# Patient Record
Sex: FEMALE | Race: BLACK OR AFRICAN AMERICAN | Employment: UNEMPLOYED | URBAN - METROPOLITAN AREA
[De-identification: names, ages, dates, MRNs, and addresses within clinical notes are randomized per-mention and may not be internally consistent; named-entity substitution may affect disease eponyms.]

---

## 2020-04-24 ENCOUNTER — HOSPITAL ENCOUNTER (INPATIENT)
Facility: CLINIC | Age: 25
LOS: 4 days | Discharge: HOME | End: 2020-04-28
Attending: EMERGENCY MEDICINE | Admitting: PSYCHIATRY & NEUROLOGY
Payer: COMMERCIAL

## 2020-04-24 ENCOUNTER — TRANSFERRED RECORDS (OUTPATIENT)
Dept: HEALTH INFORMATION MANAGEMENT | Facility: CLINIC | Age: 25
End: 2020-04-24

## 2020-04-24 ENCOUNTER — TELEPHONE (OUTPATIENT)
Dept: BEHAVIORAL HEALTH | Facility: CLINIC | Age: 25
End: 2020-04-24

## 2020-04-24 DIAGNOSIS — F10.129 ALCOHOL ABUSE WITH INTOXICATION (H): ICD-10-CM

## 2020-04-24 DIAGNOSIS — R45.851 SUICIDAL IDEATION: ICD-10-CM

## 2020-04-24 DIAGNOSIS — F31.5 BIPOLAR I DISORDER, MOST RECENT EPISODE (OR CURRENT) DEPRESSED, SEVERE, SPECIFIED AS WITH PSYCHOTIC BEHAVIOR (H): ICD-10-CM

## 2020-04-24 DIAGNOSIS — R45.850 HOMICIDAL IDEATION: ICD-10-CM

## 2020-04-24 LAB
ALCOHOL BREATH TEST: 0.08 (ref 0–0.01)
ALCOHOL BREATH TEST: 0.12 (ref 0–0.01)
AMPHETAMINES UR QL SCN: NEGATIVE
BARBITURATES UR QL: NEGATIVE
BENZODIAZ UR QL: NEGATIVE
CANNABINOIDS UR QL SCN: POSITIVE
COCAINE UR QL: NEGATIVE
ETHANOL UR QL SCN: POSITIVE
HCG UR QL: NEGATIVE
OPIATES UR QL SCN: NEGATIVE

## 2020-04-24 PROCEDURE — H2032 ACTIVITY THERAPY, PER 15 MIN: HCPCS

## 2020-04-24 PROCEDURE — 90791 PSYCH DIAGNOSTIC EVALUATION: CPT

## 2020-04-24 PROCEDURE — 99285 EMERGENCY DEPT VISIT HI MDM: CPT | Mod: Z6 | Performed by: EMERGENCY MEDICINE

## 2020-04-24 PROCEDURE — 25000132 ZZH RX MED GY IP 250 OP 250 PS 637: Performed by: CLINICAL NURSE SPECIALIST

## 2020-04-24 PROCEDURE — 99285 EMERGENCY DEPT VISIT HI MDM: CPT | Mod: 25 | Performed by: EMERGENCY MEDICINE

## 2020-04-24 PROCEDURE — 80307 DRUG TEST PRSMV CHEM ANLYZR: CPT | Performed by: EMERGENCY MEDICINE

## 2020-04-24 PROCEDURE — 82075 ASSAY OF BREATH ETHANOL: CPT | Performed by: EMERGENCY MEDICINE

## 2020-04-24 PROCEDURE — 80320 DRUG SCREEN QUANTALCOHOLS: CPT | Performed by: EMERGENCY MEDICINE

## 2020-04-24 PROCEDURE — 81025 URINE PREGNANCY TEST: CPT | Performed by: EMERGENCY MEDICINE

## 2020-04-24 PROCEDURE — 12400001 ZZH R&B MH UMMC

## 2020-04-24 RX ORDER — ACETAMINOPHEN 325 MG/1
650 TABLET ORAL EVERY 4 HOURS PRN
Status: DISCONTINUED | OUTPATIENT
Start: 2020-04-24 | End: 2020-04-28 | Stop reason: HOSPADM

## 2020-04-24 RX ORDER — LOPERAMIDE HCL 2 MG
2 CAPSULE ORAL EVERY 6 HOURS PRN
Status: DISCONTINUED | OUTPATIENT
Start: 2020-04-24 | End: 2020-04-28 | Stop reason: HOSPADM

## 2020-04-24 RX ORDER — TRAZODONE HYDROCHLORIDE 50 MG/1
50 TABLET, FILM COATED ORAL
Status: DISCONTINUED | OUTPATIENT
Start: 2020-04-24 | End: 2020-04-28 | Stop reason: HOSPADM

## 2020-04-24 RX ORDER — CLONIDINE HYDROCHLORIDE 0.1 MG/1
0.1 TABLET ORAL 2 TIMES DAILY PRN
Status: DISCONTINUED | OUTPATIENT
Start: 2020-04-24 | End: 2020-04-28 | Stop reason: HOSPADM

## 2020-04-24 RX ORDER — OLANZAPINE 10 MG/2ML
10 INJECTION, POWDER, FOR SOLUTION INTRAMUSCULAR 3 TIMES DAILY PRN
Status: DISCONTINUED | OUTPATIENT
Start: 2020-04-24 | End: 2020-04-28 | Stop reason: HOSPADM

## 2020-04-24 RX ORDER — ONDANSETRON 4 MG/1
4 TABLET, ORALLY DISINTEGRATING ORAL EVERY 6 HOURS PRN
Status: DISCONTINUED | OUTPATIENT
Start: 2020-04-24 | End: 2020-04-28 | Stop reason: HOSPADM

## 2020-04-24 RX ORDER — HYDROXYZINE HYDROCHLORIDE 25 MG/1
25-50 TABLET, FILM COATED ORAL EVERY 4 HOURS PRN
Status: DISCONTINUED | OUTPATIENT
Start: 2020-04-24 | End: 2020-04-28 | Stop reason: HOSPADM

## 2020-04-24 RX ORDER — BISACODYL 10 MG
10 SUPPOSITORY, RECTAL RECTAL DAILY PRN
Status: DISCONTINUED | OUTPATIENT
Start: 2020-04-24 | End: 2020-04-28 | Stop reason: HOSPADM

## 2020-04-24 RX ORDER — DIAZEPAM 5 MG
5-20 TABLET ORAL EVERY 30 MIN PRN
Status: DISCONTINUED | OUTPATIENT
Start: 2020-04-24 | End: 2020-04-27

## 2020-04-24 RX ORDER — ALUMINA, MAGNESIA, AND SIMETHICONE 2400; 2400; 240 MG/30ML; MG/30ML; MG/30ML
30 SUSPENSION ORAL EVERY 4 HOURS PRN
Status: DISCONTINUED | OUTPATIENT
Start: 2020-04-24 | End: 2020-04-28 | Stop reason: HOSPADM

## 2020-04-24 RX ORDER — OLANZAPINE 5 MG/1
5-10 TABLET ORAL 3 TIMES DAILY PRN
Status: DISCONTINUED | OUTPATIENT
Start: 2020-04-24 | End: 2020-04-28 | Stop reason: HOSPADM

## 2020-04-24 RX ADMIN — TRAZODONE HYDROCHLORIDE 50 MG: 50 TABLET ORAL at 19:52

## 2020-04-24 RX ADMIN — ACETAMINOPHEN 650 MG: 325 TABLET ORAL at 17:24

## 2020-04-24 RX ADMIN — NICOTINE POLACRILEX 2 MG: 2 GUM, CHEWING BUCCAL at 18:03

## 2020-04-24 RX ADMIN — HYDROXYZINE HYDROCHLORIDE 50 MG: 25 TABLET, FILM COATED ORAL at 18:17

## 2020-04-24 ASSESSMENT — ENCOUNTER SYMPTOMS
CONFUSION: 0
HALLUCINATIONS: 1
COLOR CHANGE: 0
ARTHRALGIAS: 0
DIFFICULTY URINATING: 0
HEADACHES: 0
WEAKNESS: 1
SEIZURES: 0
TREMORS: 1
NAUSEA: 1
ABDOMINAL PAIN: 0
SHORTNESS OF BREATH: 0
NECK STIFFNESS: 0
EYE REDNESS: 0
FEVER: 0

## 2020-04-24 ASSESSMENT — ACTIVITIES OF DAILY LIVING (ADL)
SWALLOWING: 0-->SWALLOWS FOODS/LIQUIDS WITHOUT DIFFICULTY
TOILETING: 0-->INDEPENDENT
BATHING: 0-->INDEPENDENT
TRANSFERRING: 0-->INDEPENDENT
FALL_HISTORY_WITHIN_LAST_SIX_MONTHS: NO
RETIRED_EATING: 0-->INDEPENDENT
COGNITION: 0 - NO COGNITION ISSUES REPORTED
DRESS: 0-->INDEPENDENT
RETIRED_COMMUNICATION: 0-->UNDERSTANDS/COMMUNICATES WITHOUT DIFFICULTY
AMBULATION: 0-->INDEPENDENT

## 2020-04-24 NOTE — ED NOTES
ED to Behavioral Floor Handoff    SITUATION  Samuel Cowan is a 24 year old female who speaks English and lives in a home with family members The patient arrived in the ED by private car from home with a complaint of Suicidal  .The patient's current symptoms started/worsened 3 day(s) ago and during this time the symptoms have increased.   In the ED, pt was diagnosed with   Final diagnoses:   Suicidal ideation        Initial vitals were: BP: (!) 140/93  Pulse: 87  Temp: 97.8  F (36.6  C)  Resp: 16  SpO2: 100 %   --------  Is the patient diabetic? No   If yes, last blood glucose? --     If yes, was this treated in the ED? --  --------  Is the patient inebriated (ETOH) Yes or Impaired on other substances? No  MSSA done? N/A  Last MSSA score: --    Were withdrawal symptoms treated? N/A  Does the patient have a seizure history? No. If yes, date of most recent seizure--  --------  Is the patient patient experiencing suicidal ideation? Thoughts and plan to slit her wrists  Homicidal ideation? denies current or recent homicidal ideation or behaviors.  However, to [facility] she stated she was having thoughts to slit her two kids throats and wrists  Self-injurious behavior/urges? denies current or recent self injurious behavior or ideation.  ------  Was pt aggressive in the ED No  Was a code called No  Is the pt now cooperative? Yes  -------  Meds given in ED: Medications - No data to display   Family present during ED course? No  Family currently present? No    BACKGROUND  Does the patient have a cognitive impairment or developmental disability? No  Allergies: No Known Allergies.   Social demographics are   Social History     Socioeconomic History     Marital status: Single     Spouse name: None     Number of children: None     Years of education: None     Highest education level: None   Occupational History     None   Social Needs     Financial resource strain: None     Food insecurity     Worry: None     Inability: None      Transportation needs     Medical: None     Non-medical: None   Tobacco Use     Smoking status: Current Every Day Smoker     Packs/day: 0.25     Smokeless tobacco: Never Used   Substance and Sexual Activity     Alcohol use: Yes     Comment: vodka daily     Drug use: Not Currently     Types: Marijuana     Comment: Ecstasy (1x/2wks); Oxycodone (1x/2 wks); THC (daily)     Sexual activity: Yes     Birth control/protection: Condom, Implant   Lifestyle     Physical activity     Days per week: None     Minutes per session: None     Stress: None   Relationships     Social connections     Talks on phone: None     Gets together: None     Attends Sikhism service: None     Active member of club or organization: None     Attends meetings of clubs or organizations: None     Relationship status: None     Intimate partner violence     Fear of current or ex partner: None     Emotionally abused: None     Physically abused: None     Forced sexual activity: None   Other Topics Concern     None   Social History Narrative     None        ASSESSMENT  Labs results   Labs Ordered and Resulted from Time of ED Arrival Up to the Time of Departure from the ED   DRUG ABUSE SCREEN 6 CHEM DEP URINE ([facility]) - Abnormal; Notable for the following components:       Result Value    Cannabinoids Qual Urine Positive (*)     Ethanol Qual Urine Positive (*)     All other components within normal limits   ALCOHOL BREATH TEST POCT - Abnormal; Notable for the following components:    Alcohol Breath Test 0.116 (*)     All other components within normal limits   HCG QUALITATIVE URINE      Imaging Studies: No results found for this or any previous visit (from the past 24 hour(s)).   Most recent vital signs BP (!) 140/93   Pulse 87   Temp 97.8  F (36.6  C) (Oral)   Resp 16   SpO2 100%    Abnormal labs/tests/findings requiring intervention:---   Pain control: pt had none  Nausea control: pt had none    RECOMMENDATION  Are any infection precautions needed  (MRSA, VRE, etc.)? No If yes, what infection? --  ---  Does the patient have mobility issues? independently. If yes, what device does the pt use? ---  ---  Is patient on 72 hour hold or commitment? No If on 72 hour hold, have hold and rights been given to patient? N/A  Are admitting orders written if after 10 p.m. ?N/A  Tasks needing to be completed:---     Mary Linda RN   [facility]-- [phone] [facility]   [phone] [facility]

## 2020-04-24 NOTE — ED NOTES
Pt brought in by EMS. Pt was on the phone with kika NICHOLAS this morning for one hour. After the conversation with CRISTOPHER, they recommended pt to call EMS to be brought in. Pt is having increased depression since January. Pt states she has been off of medications for 6 months. She lives at home with her mom. She states she is suicidal with a thoughts and a plan of last night to slit both of her wrists. Auditory hallucinations/voices telling herself to kill herself. Denies any homicidal ideations. Pt does drink vodka daily, amount varies per pt.

## 2020-04-24 NOTE — ED NOTES
"[facility] called intake, intake called charge nurse--wanted to make sure we were updated. Pt stated on the phone to [facility] this morning that she was having thoughts of wanting to \"slit both of her childrens throats and cut their wrists and was still feeling this way this morning\". MD notified.   "
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
## 2020-04-24 NOTE — ED NOTES
RN went in to get the pt's vital, prior to pt transferring upstairs. Pt asked this writer a few times if this was a voluntary admission, or it was required. This writer explained to the pt at this time, due to the pt's symptoms and complaints it is required. Pt ok with this.
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
## 2020-04-24 NOTE — ED NOTES
Bed: ED16C  Expected date: 4/24/20  Expected time: 9:10 AM  Means of arrival:   Comments:  N 732--24 yr F, SI, cooperative, no covid sx, yellow

## 2020-04-24 NOTE — PHARMACY-ADMISSION MEDICATION HISTORY
Admission Medication History Completed by Pharmacy    Sources:   Patient interview (via phone due to enhanced contact precautions)    Pertinent changes made to PTA medication list (reason):  No changes    Additional Information:   - Patient denies being prescribed any medications or taking any over-the-counter (OTC) products such as vitamins, supplements, sleep aids, etc.      Prior to Admission medications    No Medications     Time spent: 5 minutes  -----------  Domo ArreagaD., [facility] Hospital[facility]  Behavioral Health Inpatient Pharmacist  [facility] ([facility]) Emergency Department  Phone: *[phone] (Ascom) or [phone]

## 2020-04-24 NOTE — PROGRESS NOTES
04/24/20 1532   Patient Belongings   Did you bring any home meds/supplements to the hospital?  No   Patient Belongings locker   Patient Belongings Remaining with Patient other (see comments)   Patient Belongings Put in Hospital Secure Location (Security or Locker, etc.) other (see comments)   Belongings Search Yes   Clothing Search Yes   Second Staff Modesto Stevens     In security envelope #[id number]:  $205.00 cash, 1 visa gift car #[id number], [place] EBT card # [id number], [employer] Visa #[id number], [place] Visa gift card # [id number], [employer] Visa #[id number], [employer] Visa #[id number], [place] pre-pay Visa debit #[id number].    In Locker:  1 black hooded sweatshirt (strings), 1 gray vinyl wallet, 1 black iPhone (with  and cord), 1 pack cigarettes (and lighter), 1 pair white shoes, 1 pair black socks, 1 pair plaid PJ pants.      A               Admission:  I am responsible for any personal items that are not sent to the safe or pharmacy.  [facility] is not responsible for loss, theft or damage of any property in my possession.    Signature:  _________________________________ Date: _______  Time: _____                                              Staff Signature:  ____________________________ Date: ________  Time: _____      2nd Staff person, if patient is unable/unwilling to sign:    Signature: ________________________________ Date: ________  Time: _____     Discharge:  [facility] has returned all of my personal belongings:    Signature: _________________________________ Date: ________  Time: _____                                          Staff Signature:  ____________________________ Date: ________  Time: _____

## 2020-04-25 LAB
ALBUMIN SERPL-MCNC: 3.3 G/DL (ref 3.4–5)
ALP SERPL-CCNC: 110 U/L (ref 40–150)
ALT SERPL W P-5'-P-CCNC: 34 U/L (ref 0–50)
ANION GAP SERPL CALCULATED.3IONS-SCNC: 6 MMOL/L (ref 3–14)
AST SERPL W P-5'-P-CCNC: 22 U/L (ref 0–45)
BASOPHILS # BLD AUTO: 0 10E9/L (ref 0–0.2)
BASOPHILS NFR BLD AUTO: 0.8 %
BILIRUB SERPL-MCNC: 1.3 MG/DL (ref 0.2–1.3)
BUN SERPL-MCNC: 10 MG/DL (ref 7–30)
CALCIUM SERPL-MCNC: 8.4 MG/DL (ref 8.5–10.1)
CHLORIDE SERPL-SCNC: 108 MMOL/L (ref 94–109)
CHOLEST SERPL-MCNC: 108 MG/DL
CO2 SERPL-SCNC: 25 MMOL/L (ref 20–32)
CREAT SERPL-MCNC: 0.81 MG/DL (ref 0.52–1.04)
DIFFERENTIAL METHOD BLD: ABNORMAL
EOSINOPHIL # BLD AUTO: 0.1 10E9/L (ref 0–0.7)
EOSINOPHIL NFR BLD AUTO: 1.6 %
ERYTHROCYTE [DISTWIDTH] IN BLOOD BY AUTOMATED COUNT: 18.6 % (ref 10–15)
GFR SERPL CREATININE-BSD FRML MDRD: >90 ML/MIN/{1.73_M2}
GLUCOSE SERPL-MCNC: 76 MG/DL (ref 70–99)
HCT VFR BLD AUTO: 34.8 % (ref 35–47)
HDLC SERPL-MCNC: 64 MG/DL
HGB BLD-MCNC: 10.9 G/DL (ref 11.7–15.7)
IMM GRANULOCYTES # BLD: 0 10E9/L (ref 0–0.4)
IMM GRANULOCYTES NFR BLD: 0 %
LDLC SERPL CALC-MCNC: 35 MG/DL
LYMPHOCYTES # BLD AUTO: 1.1 10E9/L (ref 0.8–5.3)
LYMPHOCYTES NFR BLD AUTO: 30.5 %
MCH RBC QN AUTO: 24.6 PG (ref 26.5–33)
MCHC RBC AUTO-ENTMCNC: 31.3 G/DL (ref 31.5–36.5)
MCV RBC AUTO: 79 FL (ref 78–100)
MONOCYTES # BLD AUTO: 0.5 10E9/L (ref 0–1.3)
MONOCYTES NFR BLD AUTO: 14.3 %
NEUTROPHILS # BLD AUTO: 1.9 10E9/L (ref 1.6–8.3)
NEUTROPHILS NFR BLD AUTO: 52.8 %
NONHDLC SERPL-MCNC: 44 MG/DL
NRBC # BLD AUTO: 0 10*3/UL
NRBC BLD AUTO-RTO: 0 /100
PLATELET # BLD AUTO: 172 10E9/L (ref 150–450)
POTASSIUM SERPL-SCNC: 3.2 MMOL/L (ref 3.4–5.3)
PROT SERPL-MCNC: 6.7 G/DL (ref 6.8–8.8)
RBC # BLD AUTO: 4.43 10E12/L (ref 3.8–5.2)
SODIUM SERPL-SCNC: 139 MMOL/L (ref 133–144)
TRIGL SERPL-MCNC: 44 MG/DL
TSH SERPL DL<=0.005 MIU/L-ACNC: 0.71 MU/L (ref 0.4–4)
WBC # BLD AUTO: 3.6 10E9/L (ref 4–11)

## 2020-04-25 PROCEDURE — 80053 COMPREHEN METABOLIC PANEL: CPT | Performed by: CLINICAL NURSE SPECIALIST

## 2020-04-25 PROCEDURE — 25000132 ZZH RX MED GY IP 250 OP 250 PS 637: Performed by: CLINICAL NURSE SPECIALIST

## 2020-04-25 PROCEDURE — 36415 COLL VENOUS BLD VENIPUNCTURE: CPT | Performed by: CLINICAL NURSE SPECIALIST

## 2020-04-25 PROCEDURE — 85025 COMPLETE CBC W/AUTO DIFF WBC: CPT | Performed by: CLINICAL NURSE SPECIALIST

## 2020-04-25 PROCEDURE — 12400001 ZZH R&B MH UMMC

## 2020-04-25 PROCEDURE — 84443 ASSAY THYROID STIM HORMONE: CPT | Performed by: CLINICAL NURSE SPECIALIST

## 2020-04-25 PROCEDURE — 99223 1ST HOSP IP/OBS HIGH 75: CPT | Mod: 95 | Performed by: PSYCHIATRY & NEUROLOGY

## 2020-04-25 PROCEDURE — 80061 LIPID PANEL: CPT | Performed by: CLINICAL NURSE SPECIALIST

## 2020-04-25 PROCEDURE — 25000132 ZZH RX MED GY IP 250 OP 250 PS 637: Performed by: PSYCHIATRY & NEUROLOGY

## 2020-04-25 RX ORDER — CITALOPRAM HYDROBROMIDE 10 MG/1
10 TABLET ORAL DAILY
Status: DISCONTINUED | OUTPATIENT
Start: 2020-04-25 | End: 2020-04-28 | Stop reason: HOSPADM

## 2020-04-25 RX ADMIN — CITALOPRAM HYDROBROMIDE 10 MG: 10 TABLET ORAL at 16:57

## 2020-04-25 RX ADMIN — ACETAMINOPHEN 650 MG: 325 TABLET ORAL at 16:57

## 2020-04-25 ASSESSMENT — ACTIVITIES OF DAILY LIVING (ADL)
ORAL_HYGIENE: INDEPENDENT
ORAL_HYGIENE: INDEPENDENT
DRESS: INDEPENDENT
LAUNDRY: UNABLE TO COMPLETE
HYGIENE/GROOMING: INDEPENDENT
DRESS: SCRUBS (BEHAVIORAL HEALTH);INDEPENDENT
HYGIENE/GROOMING: INDEPENDENT

## 2020-04-25 ASSESSMENT — MIFFLIN-ST. JEOR: SCORE: 1871.27

## 2020-04-25 NOTE — PROGRESS NOTES
Writer made a child protection reort to [facility] regarding Pt's statements to her mother that she has thoughts of suicide/hoomicide with a plan to cut her wrists and cut her children's throats.  [facility]  name- writer Richelle also completed online report.
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
## 2020-04-25 NOTE — PROGRESS NOTES
SPIRITUAL HEALTH SERVICES  SPIRITUAL ASSESSMENT Progress Note  [facility] 4[facility]   ON-CALL VISIT    REFERRAL SOURCE: Request at Admission    Spoke with nurse who requested that tomorrow's on call  contact regarding possible visit. Nurse indicated that patient is appropriate for a visit, if possible, at that time.    PLAN: I will communicate request with tomorrow's on call .    Ester Boateng  Chaplain Resident  Pager  [phone]

## 2020-04-25 NOTE — PROGRESS NOTES
A no roommate order was entered due to admission information of patient endorsing homicidal ideation.

## 2020-04-25 NOTE — PROGRESS NOTES
"   04/25/20 1240   Behavioral Health   Hallucinations denies / not responding to hallucinations   Thinking poor concentration   Orientation person: oriented;place: oriented;date: oriented;time: oriented   Memory baseline memory   Insight poor   Judgement impaired   Eye Contact at examiner   Affect blunted, flat   Mood mood is calm   Physical Appearance/Attire attire appropriate to age and situation;appears stated age   Hygiene well groomed   Suicidality other (see comments)  (pt denies )   1. Wish to be Dead (Recent) No   2. Non-Specific Active Suicidal Thoughts (Recent) No   Self Injury other (see comment)  (pt dneies )   Elopement   (none observed )   Activity   (present in the miliue )   Speech coherent;clear   Medication Sensitivity no observed side effects;no stated side effects   Psychomotor / Gait steady;balanced       Pt was calm and cooperative this morning and afternoon. She ate breakfast and lunch. For the majority of the day pt sat in the lounge coloring to herself quietly. Writer checked in with pt and asked if she was feeling suicidal and pt stated \"no.\" Pt also denies SIB, AH, VH, and HI. Pt met with provider via tali and talked for a little bit. Pt is having difficulty with insight into her situation. Pt does not feel she needs to be here and want to be discharged. Provider feels pt needs to stay here for now and pt accepts. After interview pt went to take a shower.   "
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
## 2020-04-25 NOTE — PROGRESS NOTES
"CLINICAL NUTRITION SERVICES - ASSESSMENT NOTE     Nutrition Prescription    RECOMMENDATIONS FOR MDs/PROVIDERS TO ORDER:  Due to concern for heavy ETOH use, may want to consider MVI, Folate, and Thiamine supplementation     Malnutrition Status:    Unable to determine due to no face to face patient visit     Recommendations already ordered by Registered Dietitian (RD):  None     Future/Additional Recommendations:  Monitor meal intakes  Monitor weight trends - RD did speak with pt's RN and requested a current ht/wt be obtained for this admission      REASON FOR ASSESSMENT  Samuel Cowan is a/an 24 year old female assessed by the dietitian for Admission Nutrition Risk Screen for reduced oral intake over the last month    NUTRITION/MEDICAL HISTORY  Per chart review: Pt admits to facility in the setting of SI/HI, depression, increasing ETOH abuse.     Patient visit done by phone today in light of reduced in-person exposure during COVID outbreak. Pt confirms above in the setting of ETOH use, but reports appetite is improve on the unit. Pt confirms menu selection and snack selection on the unit is adequate. Reviewed weight trends, pt reports a UBW of somewhere in the \"240's\". Discussed ETOH use, pt reports taking a pint of liquor daily recently, RD discussed if Provider deemed appropriate, would suggest vitamin supplementation, pt agreeing to taking of ordered by Provider. Pt declined needing any other nutrition interventions at this time, RD encouraged adequate meal/snack intakes on the unit for improved health status.     CURRENT NUTRITION ORDERS  Diet: Regular  Intake/Tolerance: Appetite improving per pt report     LABS  Labs reviewed    MEDICATIONS  Clonidine    ANTHROPOMETRICS  Weight 113.3 kg (249 lb 11.2 oz) 01/29/2020 Per CE     Height 167.6 cm (5' 6\") 01/26/2020 Per CE    **No current ht/wt in chart to assess**  Pt reports a UBW of somewhere in the \"240's\"  IBW: 59 kg  BMI: Obesity Grade III BMI >40 per above " information     Dosing Weight: 72.7 kg - adjusted BW from above information     ASSESSED NUTRITION NEEDS  Estimated Energy Needs: 1815 kcals/day (25 kcals/kg)  Justification: Maintenance/obese  Estimated Protein Needs: 60 grams protein/day (0.8 grams of pro/kg)  Justification: Maintenance/obese   Estimated Fluid Needs: 2180 mL/day (30 mL/kg)   Justification: Maintenance    MALNUTRITION  % Intake: Intake does not meet criteria at present time   % Weight Loss: None noted  Subcutaneous Fat Loss: Unable to assess  Muscle Loss: Unable to assess  Fluid Accumulation/Edema: None noted  Malnutrition Diagnosis: Unable to determine due to no face to face patient visit     NUTRITION DIAGNOSIS  Predicted inadequate nutrient intake related to ETOH use     INTERVENTIONS  Implementation  Nutrition Education: Pt was encouraged to increase oral intakes of meals/snacks for improved health status.      Goals  Patient to consume % of nutritionally adequate meal trays TID, or the equivalent with supplements/snacks.     Monitoring/Evaluation  Progress toward goals will be monitored and evaluated per protocol.

## 2020-04-25 NOTE — PROGRESS NOTES
04/24/20 2200   Art Therapy   Type of Intervention Group art therapy   Response participates with encouragement   Hours 1   Treatment Detail    (Art Therapy)watercolor self expression   Art Therapy Goal-to cope, express, contribute, regulate and sublimate emotions through the creative arts process and Art Therapy directives within a group setting.     Outcome-  Pt was engaged.She did a thoughtful painting she said was camouflage, but brighter colors. Pt was pleasant and cooperative.

## 2020-04-26 PROCEDURE — 25000132 ZZH RX MED GY IP 250 OP 250 PS 637: Performed by: CLINICAL NURSE SPECIALIST

## 2020-04-26 PROCEDURE — 25000132 ZZH RX MED GY IP 250 OP 250 PS 637: Performed by: PSYCHIATRY & NEUROLOGY

## 2020-04-26 PROCEDURE — 12400001 ZZH R&B MH UMMC

## 2020-04-26 RX ADMIN — ACETAMINOPHEN 650 MG: 325 TABLET ORAL at 11:00

## 2020-04-26 RX ADMIN — CITALOPRAM HYDROBROMIDE 10 MG: 10 TABLET ORAL at 08:01

## 2020-04-26 RX ADMIN — TRAZODONE HYDROCHLORIDE 50 MG: 50 TABLET ORAL at 19:11

## 2020-04-26 ASSESSMENT — ACTIVITIES OF DAILY LIVING (ADL)
HYGIENE/GROOMING: INDEPENDENT
DRESS: SCRUBS (BEHAVIORAL HEALTH);INDEPENDENT
ORAL_HYGIENE: INDEPENDENT

## 2020-04-26 ASSESSMENT — MIFFLIN-ST. JEOR: SCORE: 1867.64

## 2020-04-26 NOTE — PROGRESS NOTES
Pt reported to writer that she was having some heavy vaginal bleeding starting this morning. Pt states that she has had arm implanted birth control for 1.5 years. Pt reports spotting has been normal for her, but she has not experienced heavy bleeding. VSS and pt does not appear to be in any apparent distress and denies any other abnormal signs or symptoms. IM consulted and they recommended obtaining an order for an OBGYN consult from psychiatrist on call. Consult ordered. Will continue to monitor for any change or worsening in symptoms.

## 2020-04-26 NOTE — PROGRESS NOTES
Pt had an okay shift. Present on milieu and calm/cooperative. Pt was withdrawn and presented with a blunted affect. Pt stated she wanted to discharge soon and wanted to sleep away the time. Pt reported some anxiety and found a fidget and coloring helpful. Pt was overheard having a tough conversation over the phone, but did not talk with staff about it. Unable to do formal check-in, as pt went to bed early.        04/25/20 2000   Behavioral Health   Hallucinations denies / not responding to hallucinations   Thinking poor concentration;intact   Orientation person: oriented;place: oriented;date: oriented;time: oriented   Memory baseline memory   Insight poor   Judgement impaired   Eye Contact at examiner   Affect blunted, flat   Mood anxious;mood is calm   Physical Appearance/Attire attire appropriate to age and situation   Hygiene well groomed   Suicidality other (see comments)  (DIONISIO)   Self Injury other (see comment)  (DIONISIO)   Elopement   (none stated or observed)   Activity other (see comment)  (present on milieu)   Speech clear;coherent   Medication Sensitivity no stated side effects;no observed side effects   Psychomotor / Gait balanced;steady   Activities of Daily Living   Hygiene/Grooming independent   Oral Hygiene independent   Dress scrubs (behavioral health);independent   Room Organization independent

## 2020-04-26 NOTE — PROGRESS NOTES
04/26/20 1015   Visit Information   Visit Made By Staff    Type of Visit Follow-up;On-call;Staff consultation/triage   Visited Other  (Triaged for   on Tuesday.)   Interventions   Plan of Care Review   With interdisciplinary team   SPIRITUAL HEALTH SERVICES  [facility]  ON-CALL VISIT     REFERRAL SOURCE: Triage from Saturday on-call .     In consultation with pt's nurse, arranged for her to be seen on Tuesday by  Lillie vizcarra.     PLAN: Will let Lillie know of request.       Butch Harman  Staff   Spiritual Health Services  Pgr: [phone]    * [facility] remains available 24/7 for emergent requests/referrals, either by having the switchboard page the on-call  or by entering an ASAP/STAT consult in Epic (this will also page the on-call ).*
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
## 2020-04-26 NOTE — PROGRESS NOTES
"   04/26/20 1400   Behavioral Health   Hallucinations denies / not responding to hallucinations   Thinking poor concentration   Orientation person: oriented;place: oriented;date: oriented   Memory baseline memory   Insight poor   Judgement impaired   Eye Contact at examiner   Affect blunted, flat   Mood anxious;mood is calm   ADL Assessment (WDL) WDL   Physical Appearance/Attire attire appropriate to age and situation   Hygiene well groomed   Suicidality (WDL) WDL   Suicidality other (see comments)  (denies)   1. Wish to be Dead (Recent) No   2. Non-Specific Active Suicidal Thoughts (Recent) No   Enviromental Risk Factors None   Self Injury other (see comment)   Activity other (see comment)  (up in the lounge coloring)   Speech (WDL) WDL   Speech clear;coherent   Medication Sensitivity no stated side effects;no observed side effects   Psychomotor Gait (WDL) WDL   Psychomotor / Gait balanced;steady   Overt Agression (WDL) WDL     Pt was calm and cooperative this morning and afternoon. For the majority of the morning she alternated between the room and lounge. Pt makes her needs known and attends groups. Pt did not socialize with peers or staff. After lunch pt returned to her room to lay down and sleep. Writer checked in with pt and was asked if she was feeling suicidal and pt stated \"no.\" Pt also denied SIB, HI, AH, and VH. Pt was asked if she had a goal for the day and she stated \"no.\" Will continue to monitor.   "

## 2020-04-27 PROCEDURE — H2032 ACTIVITY THERAPY, PER 15 MIN: HCPCS

## 2020-04-27 PROCEDURE — 25000132 ZZH RX MED GY IP 250 OP 250 PS 637: Performed by: PSYCHIATRY & NEUROLOGY

## 2020-04-27 PROCEDURE — 25000132 ZZH RX MED GY IP 250 OP 250 PS 637: Performed by: CLINICAL NURSE SPECIALIST

## 2020-04-27 PROCEDURE — 12400001 ZZH R&B MH UMMC

## 2020-04-27 PROCEDURE — G0177 OPPS/PHP; TRAIN & EDUC SERV: HCPCS

## 2020-04-27 RX ADMIN — CITALOPRAM HYDROBROMIDE 10 MG: 10 TABLET ORAL at 07:48

## 2020-04-27 RX ADMIN — ACETAMINOPHEN 650 MG: 325 TABLET ORAL at 18:45

## 2020-04-27 RX ADMIN — TRAZODONE HYDROCHLORIDE 50 MG: 50 TABLET ORAL at 21:16

## 2020-04-27 RX ADMIN — NICOTINE POLACRILEX 2 MG: 2 GUM, CHEWING BUCCAL at 18:45

## 2020-04-27 RX ADMIN — ACETAMINOPHEN 650 MG: 325 TABLET ORAL at 12:14

## 2020-04-27 ASSESSMENT — ACTIVITIES OF DAILY LIVING (ADL)
ORAL_HYGIENE: INDEPENDENT
HYGIENE/GROOMING: INDEPENDENT
LAUNDRY: UNABLE TO COMPLETE
DRESS: SCRUBS (BEHAVIORAL HEALTH)

## 2020-04-27 NOTE — PROGRESS NOTES
Pt had a good shift. Present on milieu and calm/cooperative with staff. Pt was present on milieu and socialized with peers. Pt did not attend groups, but sang karaoke during an informal group. Pt denies SI/SIB. Pt is hoping to discharge tomorrow. Pt said she wants to get back to her kids. Denies SI/SIB.       04/26/20 2000   Behavioral Health   Hallucinations denies / not responding to hallucinations   Thinking poor concentration   Orientation person: oriented;place: oriented;date: oriented;time: oriented   Memory baseline memory   Insight poor   Judgement intact   Eye Contact at examiner   Affect full range affect   Mood mood is calm   Physical Appearance/Attire attire appropriate to age and situation   Hygiene well groomed   Suicidality other (see comments)  (denies)   1. Wish to be Dead (Recent) No   2. Non-Specific Active Suicidal Thoughts (Recent) No   Self Injury other (see comment)  (denies)   Elopement   (none stated or observed)   Activity other (see comment)  (present on milieu)   Speech clear;coherent   Medication Sensitivity no stated side effects;no observed side effects   Psychomotor / Gait balanced;steady   Activities of Daily Living   Hygiene/Grooming independent   Oral Hygiene independent   Dress scrubs (behavioral health);independent   Room Organization independent

## 2020-04-27 NOTE — PLAN OF CARE
"48 HOUR NOTE     Pt came out to nursing station early this morning and requested her medications. OBGYN called and asked about pt bleeding from vaginal area. Pt had stressed some concern yesterday about heavy bleeding due to possible birth control implant. Pt was asked if she was still having vaginal bleeding this morning and pt stated \"no, the bleeding has stopped.\" OBGYN states that if there are any other issues to call but for now a consult is not needed. Pt ate breakfast and appears to be in a bright mood today as she is smiling. Pt has not been scoring on her MSSA so it was discontinued. At 1214 pt came to the nursing station complaining of tooth pain and was administered tylenol 650mg. Pt mother called around 1230 to get a update on Shatayja. Mother is aware that pt may be here for a little while. Today pt has been up pacing the lovell. She did attend groups and was medication complaint. Writer checked in with pt at 1300 and asked if she was feeling suicidal and pt stated \"no, I just want to go home. You all cant hold me up in here. CPS knows that my mother is there with the kids and they are fine.\" Writer told her that when I know something about you discharge I will let you know. Pt accepted. Pt is also denying SIB, AH, VH, and HI. Will continue to monitor.   "

## 2020-04-27 NOTE — CONSULTS
4/27/2020    CD consult acknowledged.   Pt will be seen once she completes CD paperwork.   Please email completed paperwork to:  [email or web address].    AMENA Matson
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
## 2020-04-27 NOTE — PLAN OF CARE
BEHAVIORAL TEAM DISCUSSION    Participants: 4A Provider: Debra Naegele, APRN, CNS; 4A RN's:  Herbie Raza RN; 4A CTC's:  Madyson Gallegos, (CTC); Dana Linda OT  Progress: No Change.  Continued Stay Criteria/Rationale: Weekend admit needs to see provider, Follow up call w/CPS re: report made.  Medical/Physical: None  Precautions:    Behavioral Orders   Procedures    Code 1 - Restrict to Unit    Routine Programming     As clinically indicated    Status 15     Every 15 minutes.    Suicide precautions     Patients on Suicide Precautions should have a Combination Diet ordered that includes a Diet selection(s) AND a Behavioral Tray selection for Safe Tray - with utensils, or Safe Tray - NO utensils       Plan: CTC will coordinate disposition and after care planning.  The following services will be provided to the patient; psychiatric assessment, medication management, therapeutic milieu, individual and group support, art therapy, and skills/OT groups.   Rationale for change in precautions or plan: No Change.

## 2020-04-27 NOTE — PLAN OF CARE
"INITIAL OT NOTE  Problem: OT General Care Plan  Goal: OT Goal 1    Pt actively participated in occupational therapy clinic. Pt was able to independently initiate a creative expression task. Pt demonstrated poor planning and a disorganized task approach; somewhat impulsive. Pt appears irritable at this time - potentially because she just received news she would not be discharging today. Pt left for a break and came back and appeared to be in a better mood evidenced by socializing with others and complimenting another peers project. Pt actively participated in a structured occupational therapy group with a focus on identifying and prioritizing personal values. Pt contributed to a group discussion that facilitated self-reflection and application of personal values. Using a list of values, pt was able to identify their most important values, but she did so very quickly. When prompted to share one takeaway from the group, pt shared \"this activity was pretty easy for me. I feel like I'm on my way to living the life I want to live\".     "

## 2020-04-27 NOTE — PROGRESS NOTES
Diagnosis/Procedure:   Patient Active Problem List   Diagnosis     Depression     Suicidal behavior     Suicidal ideation               Work Completed:   [facility] had pt complete CD paperwork, CTC emailed to CD  Marissa Arvizu.     [facility] follow up with Felicitas Dum[place] CPS re: report made over the weekend. CPS reported the case was assigned to Keya [phone] for a family assessment. CPS reported that if hospital feels pt is stabilized and has a responsible adult able to care for her children pt can discharge home whenever the hospital feels she is ready. [facility] updated pt on this information as well as provider.     [facility] spoke with pt's mom re: pt moving and children supervision. Pt's mom confirmed pt will be moving to a place on her own. Pt's mom reported that she is completely fine with keeping the kids until pt is in a better spot. She expressed concern for the kids safe and gratitude for the hospital getting CPS involved.     [facility] spoke with pt. Pt was upset and crying. She is fearful she won't be able to get her apartment if she is not discharged. [facility] let pt know she can get the phone number for the apartment complex she is hoping to move into so that she can call them. Pt told CTC that she is ok with her mom taking the kids while she gets the help she needs. Pt asked about services once she discharges. [facility] discussed the inpatient/outpatinet CD treatment process. [facility] also discussed [place] and as resource for her and her kids.     Discharge plan or goal: Home with outpatient CD treatment referral and medication management with PCP.                 Barriers to discharge: CPS report otherwise no other barriers.

## 2020-04-27 NOTE — PLAN OF CARE
The patient specific goals include:   Patient will participate in unit programming  Patient will identify triggers and positive coping skills  Patient will take medications as prescribed by physician both for mental health and medical  Patient coached to work on coping packet    The patient identified the following reasons for hospitalization:  Chemical dependency issues  Suicidal Ideations    The patient identified the following goals for discharge:    Absence of Suicidal Ideations    CD treatment on an outpatient basis.

## 2020-04-28 ENCOUNTER — APPOINTMENT (OUTPATIENT)
Dept: BEHAVIORAL HEALTH | Facility: CLINIC | Age: 25
End: 2020-04-28
Attending: FAMILY MEDICINE
Payer: COMMERCIAL

## 2020-04-28 VITALS
DIASTOLIC BLOOD PRESSURE: 84 MMHG | HEART RATE: 70 BPM | OXYGEN SATURATION: 96 % | RESPIRATION RATE: 16 BRPM | SYSTOLIC BLOOD PRESSURE: 130 MMHG | HEIGHT: 66 IN | BODY MASS INDEX: 38.58 KG/M2 | TEMPERATURE: 97.5 F | WEIGHT: 240.08 LBS

## 2020-04-28 LAB
ALBUMIN SERPL-MCNC: 3.6 G/DL (ref 3.4–5)
ALP SERPL-CCNC: 109 U/L (ref 40–150)
ALT SERPL W P-5'-P-CCNC: 31 U/L (ref 0–50)
ANION GAP SERPL CALCULATED.3IONS-SCNC: 6 MMOL/L (ref 3–14)
AST SERPL W P-5'-P-CCNC: 22 U/L (ref 0–45)
BASOPHILS # BLD AUTO: 0 10E9/L (ref 0–0.2)
BASOPHILS NFR BLD AUTO: 0.6 %
BILIRUB SERPL-MCNC: 0.4 MG/DL (ref 0.2–1.3)
BUN SERPL-MCNC: 8 MG/DL (ref 7–30)
CALCIUM SERPL-MCNC: 9.1 MG/DL (ref 8.5–10.1)
CHLORIDE SERPL-SCNC: 108 MMOL/L (ref 94–109)
CO2 SERPL-SCNC: 23 MMOL/L (ref 20–32)
CREAT SERPL-MCNC: 0.74 MG/DL (ref 0.52–1.04)
DIFFERENTIAL METHOD BLD: ABNORMAL
EOSINOPHIL # BLD AUTO: 0.1 10E9/L (ref 0–0.7)
EOSINOPHIL NFR BLD AUTO: 1.8 %
ERYTHROCYTE [DISTWIDTH] IN BLOOD BY AUTOMATED COUNT: 18.3 % (ref 10–15)
GFR SERPL CREATININE-BSD FRML MDRD: >90 ML/MIN/{1.73_M2}
GLUCOSE SERPL-MCNC: 87 MG/DL (ref 70–99)
HCT VFR BLD AUTO: 37.2 % (ref 35–47)
HGB BLD-MCNC: 11.7 G/DL (ref 11.7–15.7)
IMM GRANULOCYTES # BLD: 0 10E9/L (ref 0–0.4)
IMM GRANULOCYTES NFR BLD: 0.4 %
LYMPHOCYTES # BLD AUTO: 2 10E9/L (ref 0.8–5.3)
LYMPHOCYTES NFR BLD AUTO: 41.3 %
MCH RBC QN AUTO: 24.8 PG (ref 26.5–33)
MCHC RBC AUTO-ENTMCNC: 31.5 G/DL (ref 31.5–36.5)
MCV RBC AUTO: 79 FL (ref 78–100)
MONOCYTES # BLD AUTO: 0.4 10E9/L (ref 0–1.3)
MONOCYTES NFR BLD AUTO: 8.9 %
NEUTROPHILS # BLD AUTO: 2.3 10E9/L (ref 1.6–8.3)
NEUTROPHILS NFR BLD AUTO: 47 %
NRBC # BLD AUTO: 0 10*3/UL
NRBC BLD AUTO-RTO: 0 /100
PLATELET # BLD AUTO: 185 10E9/L (ref 150–450)
POTASSIUM SERPL-SCNC: 4.2 MMOL/L (ref 3.4–5.3)
PROT SERPL-MCNC: 7.4 G/DL (ref 6.8–8.8)
RBC # BLD AUTO: 4.72 10E12/L (ref 3.8–5.2)
SODIUM SERPL-SCNC: 137 MMOL/L (ref 133–144)
WBC # BLD AUTO: 4.9 10E9/L (ref 4–11)

## 2020-04-28 PROCEDURE — H2032 ACTIVITY THERAPY, PER 15 MIN: HCPCS

## 2020-04-28 PROCEDURE — 80053 COMPREHEN METABOLIC PANEL: CPT | Performed by: CLINICAL NURSE SPECIALIST

## 2020-04-28 PROCEDURE — 85025 COMPLETE CBC W/AUTO DIFF WBC: CPT | Performed by: CLINICAL NURSE SPECIALIST

## 2020-04-28 PROCEDURE — 99239 HOSP IP/OBS DSCHRG MGMT >30: CPT | Mod: 95 | Performed by: CLINICAL NURSE SPECIALIST

## 2020-04-28 PROCEDURE — H0001 ALCOHOL AND/OR DRUG ASSESS: HCPCS | Mod: HF

## 2020-04-28 PROCEDURE — 36415 COLL VENOUS BLD VENIPUNCTURE: CPT | Performed by: CLINICAL NURSE SPECIALIST

## 2020-04-28 PROCEDURE — G0177 OPPS/PHP; TRAIN & EDUC SERV: HCPCS

## 2020-04-28 PROCEDURE — 25000132 ZZH RX MED GY IP 250 OP 250 PS 637: Performed by: PSYCHIATRY & NEUROLOGY

## 2020-04-28 RX ORDER — CITALOPRAM HYDROBROMIDE 10 MG/1
10 TABLET ORAL DAILY
Qty: 30 TABLET | Refills: 1 | Status: SHIPPED | OUTPATIENT
Start: 2020-04-29

## 2020-04-28 RX ADMIN — CITALOPRAM HYDROBROMIDE 10 MG: 10 TABLET ORAL at 08:26

## 2020-04-28 ASSESSMENT — MIFFLIN-ST. JEOR: SCORE: 1860.75

## 2020-04-28 NOTE — PLAN OF CARE
Pt given copy of their discharge instructions and medication administration instructions. All discharge plans and labs were discussed with patient. Pt reports no questions at this time regarding discharge plans, labs or medications. Pt denies any suicidal ideation, plans or intent at this time.
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
## 2020-04-28 NOTE — PLAN OF CARE
"  Problem: Adult Inpatient Plan of Care  Goal: Readiness for Transition of Care  Outcome: Improving     Behavioral  Pt slept 7.25 hours overnight; Pt oriented x 4; Pt denied SI, SIB, HI, and hallucinations.  Pt pleasant and cooperative upon appraoch; Pt active in milieu and social with select staff and peers.  Pt affect bright; Pt indicated readiness for discharge, stating \"I want to go home and see my kids.\" Pt able to identify coping skills and support system outside of hospital.     Medical  Pt did not identify any new medical complaints.     Plan  discharge  "
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
## 2020-04-28 NOTE — PROGRESS NOTES
04/28/20 2200   Art Therapy   Type of Intervention Group art therapy   Response participates with encouragement   Hours .5   Treatment Detail    (Art Therapy)gratitude/ sun metaphor  - challenging negative thinking   Art Therapy Goal-to cope, express, contribute, regulate and sublimate emotions through the creative arts process and Art Therapy directives within a group setting.     Outcome-  Pt was engaged ,pleasant and cooperative. She worked on finishing a piece about the sun she started yesterday and she shared her work as samples for the group, which the group really liked shortly before her discharge. She was able to be there for a partial group.
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
## 2020-04-28 NOTE — PROGRESS NOTES
04/27/20 2200   Art Therapy   Type of Intervention Group art therapy   Response participates with encouragement   Hours 1   Treatment Detail    (Art Therapy)gratitude/ sun metaphor   Art Therapy Goal-to cope, express, contribute, regulate and sublimate emotions through the creative arts process and Art Therapy directives within a group setting.     Outcome-  Pt was engaged. They said they were doing good, but tired. She is a second chance, kids and meeting helpful people here. They did two thoughtful gratitude/ affirmation art pieces.

## 2020-04-28 NOTE — CONSULTS
4/28/2020    CD consult completed.   Writer spoke with pt, completed Rule 25. Pt would like to attend IOP at [facility] (was there for adolescent). Discussed with pt that all outpatient services are currently through phone/video, pt understood.   Provided pt with writer's info and FRS Crystal.   Explained to pt if she changes her mind, she can contact writer to find other placement.   Pt verbalized understanding and was very polite through phone interview.    [facility]  [address], [address]  [facility] [address]  Ph: [phone]  Fax: [phone]    AMENA Matson

## 2020-04-28 NOTE — PROGRESS NOTES
Pt was present and social in milieu throughout shift. Pt ate her dinner and had good hygiene.  Pt attended groups, socialized with peers, watched movies, and played Erydel during evening shift.  Pt also spent time using the phone and talking to her children.  Pt denied SI/SIB and was calm and cooperative with staff throughout shift.       04/27/20 2239   Behavioral Health   Hallucinations denies / not responding to hallucinations   Thinking poor concentration   Orientation person: oriented;place: oriented;date: oriented;time: oriented   Memory baseline memory   Insight poor   Judgement impaired   Eye Contact at examiner   Affect full range affect   Mood mood is calm   Physical Appearance/Attire appears stated age;attire appropriate to age and situation   Hygiene well groomed   Suicidality other (see comments)  (denies)   1. Wish to be Dead (Recent) No   2. Non-Specific Active Suicidal Thoughts (Recent) No   Enviromental Risk Factors None   Self Injury other (see comment)  (denies)   Elopement   (none)   Activity other (see comment)  (present and social)   Speech clear;coherent   Medication Sensitivity no stated side effects;no observed side effects   Psychomotor / Gait balanced;steady   Activities of Daily Living   Hygiene/Grooming independent   Oral Hygiene independent   Dress scrubs (behavioral health)   Laundry unable to complete   Room Organization independent

## 2020-04-28 NOTE — DISCHARGE INSTRUCTIONS
Behavioral Discharge Planning and Instructions      Summary:  You were admitted on 4/24/2020  due to Suicidal Ideations and Homicidal Ideations/Threatening Behaviors.  You were treated by Debra Naegele, APRN, CNS and discharged on 04/28/2020 from [facility] to Home.    Principal Diagnosis:   1.  Major Depressive Disorder, severe, recurrent w/o psychosis   2.  Posttraumatic stress disorder by history.   3.  Alcohol use disorder.     4.  Cannabis use disorder.     Health Care Follow-up Appointments:   Chemical Dependency Treatment:   A referral was made on your behalf to the following treatment facility.  Please call to follow up on getting an intake date scheduled.   Date/Time: You will need to call them      Provider: [facility]  Address: [address], [address] [facility] [address]  Ph: [phone]  Fax: [phone]    Medication Management/PCP:   Attempted to schedule a follow up appointment on your behalf. They need you to call their business office first before you can schedule an appointment.   Provider: Abraham ZARCO  Address: [address] [facility] [address]  Phone: [phone]    [facility] assigned worker: Keya Teran [phone]    Psychiatric Provider Referrals:   [facility]:  [facility] [address] [place] [facility],  [address]  [place] [address] [phone]    [facility]:   [address] [address] [place], [place] [address] [phone]    [facility] (medication Management and therapy)  [address] [address] [place] [address]  Phone: [phone]  Fax: [phone]    Other Referrals:   [facility]:   Contact us: [phone]  Three locations: [place], [place] and [place]    Mental Health is as Important as Physical Health  [facility] is the [facility] s leading children s mental health center, caring for a wide variety of children s needs such as:  attention deficit disorders   trauma   behavioral problems    anxiety   learning difficulties   depression  One out of five [place] children will experience mental health challenges, yet only 20% get the help they need. [facility] caregivers have a resource to help kids who are hurting on the inside.  [facility] believes a child s mental health is as important as their physical health. Our compassionate child therapists help children develop the skills to be successful at home, in school and in the community.    They also provided family focused therapies which supports/nutures the parent-child relationship.     [facility]:   Main Office: [facility] [place] [address]  Phone: [phone]  They have programs for Parent Education and Support. You can call this number to inquire about the programs below [phone]  -Parent Education Groups  -Parent Support Outreach Program  -In-home Parent Education  -    CARE Counseling: Family Counseling, individual counseling, child counseling.  [place] : [address] [place] [address]  [place] : [address] [place] [address]  [place] : [address] [place], [place], [address]   Phone: [phone]    Attend all scheduled appointments with your outpatient providers. Call at least 24 hours in advance if you need to reschedule an appointment to ensure continued access to your outpatient providers.   Major Treatments, Procedures and Findings:  You were provided with: a psychiatric assessment, assessed for medical stability, medication evaluation and/or management and group therapy    Symptoms to Report: feeling more aggressive, increased confusion, losing more sleep, mood getting worse or thoughts of suicide    Early warning signs can include: increased depression or anxiety sleep disturbances increased thoughts or behaviors of suicide or self-harm  increased unusual thinking, such as paranoia or hearing voices    Safety and Wellness:  Take all medicines as directed.   "Make no changes unless your doctor suggests them.      Follow treatment recommendations.  Refrain from alcohol and non-prescribed drugs.  Ask your support system to help you reduce your access to items that could harm yourself or others. Items could include:  Firearms  Medicines (both prescribed and over-the-counter)  Knives and other sharp objects  Ropes and like materials  Car keys  If there is a concern for safety, call 911. If there is a concern for safety, call 911.    Resources:   Crisis Intervention: 561.975.2635 or 722-544-9157 (TTY: 190.369.7298).  Call anytime for help.  National Vanderbilt on Mental Illness (www.[place].geno.org): 835.198.2709 or 044-548-8597.  [place] Association for Children's Mental Health (www.macmh.org): 959.710.1516.  Alcoholics Anonymous (www.alcoholics-anonymous.org): Check your phone book for your local chapter.  National Suicide Prevention Line (www.mentalhealthmn.org): 870-524-RALC (0697)  Self- Management and Recovery Training., SMART-- Toll free: 662.507.1909  www.[employer]  [facility] Crisis (COPE) Response - Adult 389 652-0019  Text 4 Life: txt \"LIFE\" to 15465 for immediate support and crisis intervention  Crisis text line: Text \"[place]\" to 512292. Free, confidential, 24/7.    Sober Support Group Information:  AA/NA & Sponsor/Support  Alcoholics Anonymous (www.alcoholics-anonymous.org): for local information 24 hours/day  AA Intergroup service office in [place] (http://www.aastpaul.org/) 312.316.5633  AA Intergroup service office in [facility]: 395.176.3723. (http://www.a[place].org/)  Narcotics Anonymous (www.nam[place].org) (861) 165-1792   **Sober Fun Activities: www.soberNG Advantageactivities.[employer]/Community [facility]//[facility] Recovery Connection (MRC)  [facility] connects people seeking recovery to resources that help foster and sustain long-term recovery.  Whether you are seeking resources for treatment, transportation, housing, job training, education, health care or " other pathways to recovery, [facility] is a great place to start.    Phone: 306.539.3025.  www.[place]"[employer] (Great listing of all types of recovery and non-recovery related resources)      Lifestyle Adjustment: Adjust your lifestyle to get enough sleep, relaxation, exercise and  good nutrition. Continue to develop healthy coping skills to decrease stress and promote a sober living environment. Do not use alcohol, illegal drugs or addictive medications other than what is currently prescribed. AA, NA, and  Sponsor are excellent resources for support.     General Medication Instructions:   1. See your medication sheet(s) for instructions.   2. Take all medicines as directed.  Make no changes unless your doctor suggests them.   3. Go to all your doctor visits.  4. Be sure to have all your required lab tests. This way, your medicines can be refilled on time.  5. Do not use any drugs not prescribed by your doctor.    The treatment team has appreciated the opportunity to work with you.     Samuel,  please take care and make your recovery a daily recovery.   If you have any questions or concerns our unit number is [phone]    If you would like to obtain any specific documentation regarding your hospitalization after your discharge, contact [facility] Release of Information/Medical Records:  [phone]

## 2020-04-29 ENCOUNTER — TELEPHONE (OUTPATIENT)
Dept: BEHAVIORAL HEALTH | Facility: CLINIC | Age: 25
End: 2020-04-29

## 2020-04-29 NOTE — TELEPHONE ENCOUNTER
I will work with Gabrielle or Tosin to get you the DAANES.   I will also email you the [facility] forms.   Pt discharged on 04/28/2020 from the hospital, can be contacted on her mobile phone number listed.     SBAR  Name:   Samuel Cowan   YOB: 1995 Age:  24 year old Gender:  female   Referral Source: Self/MH unit   Referral KRISTI: N/A   Insurance: ARE: MA/PMAP (with a Robinson EVAL/UPDATE) - 1.) The admission counselor (Jh, Marleen, Ha, or Richard for LP and the service initiation counselor for the EOP & DOP programs) will fill out the [facility] referral forms with the patient's start date and then fax the [facility] referral forms to [facility] to activate the authorization for treatment.  2.) The admission counselor (see above) will document the authorization in the insurance referral note after the authorization has been obtained from [facility].    Financial Screening: Financial approval from [place]'s business office is NOT NEEDED.     Precipitating Event: Treatment due to own awareness of need for help, Treatment due to pressure from family members to get help and Treatment due to child protection issues     DOC: Alcohol and Marijuana    Additional abused substances: Nicotine     Medical: No chronic health problems     Mental Health: Depression and PTSD     Prior Detox admissions: No prior IP detoxification admission(s).    Prior CD treatments: 1 prior CD treatment(s).     Psychosocial history:   Single, in a serious relationship    2 minor child(ulisses)    Stable housing and no concerns    Good support network, Relationship conflict with family members or friends due to substance abuse, No history of legal charges, Current child protection involvement, Currently employed and Stable finances     [place] Suicide Risk Status:  Past month: 3. - Moderate Risk: Consult with or inform Supervisor, Manager or Director.  If unable to speak directly with management call the Assigned Nursing Supervisor (ANS), Refer to higher level of  "care as indicated in consultation, Refer to higher level of care as indicated in consultation, Document in Epic / [employer] to counselor, Collaborate with patient / client to develop \"Patient Safety Plan\", KRISTI to family member or close friend, Referral to PCP or psychiatrist, Address in Treatment Plan, Continuous monitoring, assessment and intervention and Address in Discharge / Transition Plan    Past 24 hours: 1. - Low Risk: Evaluation Counselors:  Document in Epic / [employer] to counselor \"Low Risk\".      Treatment Counselors:  Reassess upon admission as applicable, assess weekly in progress notes under Dimension 3 and summarize in Discharge / Treatment summary under Dimension 3.     Additional Info as needed: There was no additional information to provide at this time.         "

## 2020-04-29 NOTE — PROGRESS NOTES
4/29/2020    Referral completed.  SBAR sent to Dorinda Subramanian counselor.   DAANES completed - emailed to Arnold and Tosin.  [facility] forms filled out and emailed to Arnold to send for auth.     AMENA Matson

## 2020-04-30 NOTE — TELEPHONE ENCOUNTER
----- Message from AMENA Subramanian sent at 4/30/2020 11:43 AM CDT -----  Regarding: Scheduling  PLEASE SCHEDULE:NAYAN BRIGHT [[id number]]  FOR SERVICE INITIATION APPOINTMENT    IN [place] On:5/5/20  with AMENA Subramanian @  1200PM  Visit Type: Video     PLEASE SCHEDULE:NAYAN BRIGHT [[id number]]  For 24 sessions  START DATE: 5/5/20  AMENA Subramanian  Phase 1 Mixed in [place]  [id number] ON MON, TUES and WED 5:30 PM to 8:30 PM and THUR 5:30 PM to 7:30 PM   Visit Type: Video

## 2020-05-05 ENCOUNTER — HOSPITAL ENCOUNTER (OUTPATIENT)
Dept: BEHAVIORAL HEALTH | Facility: CLINIC | Age: 25
End: 2020-05-05
Attending: FAMILY MEDICINE
Payer: COMMERCIAL

## 2020-05-05 PROCEDURE — H2035 A/D TX PROGRAM, PER HOUR: HCPCS | Mod: GT

## 2020-05-05 PROCEDURE — H2035 A/D TX PROGRAM, PER HOUR: HCPCS | Mod: HQ,GT | Performed by: SOCIAL WORKER

## 2020-05-06 ENCOUNTER — BEH TREATMENT PLAN (OUTPATIENT)
Dept: BEHAVIORAL HEALTH | Facility: CLINIC | Age: 25
End: 2020-05-06

## 2020-05-06 ENCOUNTER — HOSPITAL ENCOUNTER (OUTPATIENT)
Dept: BEHAVIORAL HEALTH | Facility: CLINIC | Age: 25
End: 2020-05-06
Attending: FAMILY MEDICINE
Payer: COMMERCIAL

## 2020-05-06 PROCEDURE — H2035 A/D TX PROGRAM, PER HOUR: HCPCS | Mod: HQ,GT

## 2020-05-06 NOTE — GROUP NOTE
Group Therapy Documentation    PATIENT'S NAME: Samuel Cowan  MRN:   [id number]  :   1995  ACCT. NUMBER: [id number]  DATE OF SERVICE: 20  START TIME:  5:30 PM  END TIME:  8:30 PM  FACILITATOR(S): Gabriela Garcia LICSW  TOPIC: BEH Group Therapy  Number of patients attending the group:  3  Group Length:  3 Hours    Group Therapy Type: Addiction    Summary of Group / Topics Discussed:    Psychoeducation/Skills Mindfulness in Action (IOP)  Learn what mindfulness is and how to practice it. Learn how to increase awareness of the present moment, thereby reducing ruminating thoughts and learn how to embrace negative emotions instead of suppressing or avoiding them. Consistent practice has been shown to decrease reactivity and help facilitate effective action to make positive changes in behavior.     Objective(s):    Identify 2 skills to increase awareness of the present moment to reduce negative impact of ruminating thoughts    Describe how to embrace negative emotions instead of suppressing or avoiding them.    Give 1 example of how mindfulness works in the brain to calm the Survival part of the Midbrain (which is overactive in addiction)    Give1 example of how mindfulness practice may decrease reactivity and facilitate effective action    Structure (modalities, homework, worksheets, etc.):    Complete  before and after  worksheet for mindfulness practice (Wing Exercise)    Participate in Awareness of Sounds meditation    Participate in Mindful Eating meditation    Complete Mindful Listening exercise with other group member(s) (Dyads or Triads)    Participate in Sitting Meditation     Expected therapeutic outcome(s):     Increased ability to remain in present moment    Increased ability to tolerate negative emotions without returning to addiction    Therapeutic outcome(s) measured by:   Teachback and group review and evaluation form.  Telemedicine Visit: The patient's condition can be safely assessed  "and treated via synchronous audio and visual telemedicine encounter.      Reason for Telemedicine Visit: Services only offered telehealth     Originating Site (Patient Location): Patient's home     Distant Site (Provider Location): Provider Remote Setting     Consent:  The patient/guardian has verbally consented to: the potential risks and benefits of telemedicine (video visit) versus in person care; bill my insurance or make self-payment for services provided; and responsibility for payment of non-covered services.      Mode of Communication:  Video Conference via [employer]     As the provider I attest to compliance with applicable laws and regulations related to telemedicine.    30  Group Attendance:  Attended group session    Patient's response to the group topic/interactions:  discussed personal experience with topic, expressed readiness to alter behaviors and expressed understanding of topic    Patient appeared to be Actively participating.        Client specific details:  This was Samuel's  first session with this therapist and she shared with group what brought her to treatment. Her drug of choice is alcohol and her SD is 4/23/20. She received her journal format today, along with the philosophy behind it, and has agreed to do this daily. Mindfulness: She participated in the exercises today and will continue to do \"prayerful mindfulness\" this week; Stress: 3-4; Cravings: None this week.  "

## 2020-05-06 NOTE — TREATMENT PLAN
[facility]  Adult Chemical Dependency Program  Treatment Plan Requirements    These services are provided by the facility for each patient/client according to the individual's treatment plan:    Individual and group counseling    Education    Transition services    Services to address any co-occurring mental illness    Service coordination    Initial Treatment Plan Goals:  1. Complete all the requirements of Program Orientation.  2. Maintain medication compliance throughout the program.  3. Complete requirements for workshop/skills groups based on identified issues on your problem list.  4. Complete the support group attendance feedback sheet weekly.  5. Gain family involvement in treatment process to address family issues from the problem list.  6. Attend and participate in all required groups per individual treatment plan.  7. Focus attention to individualized issues from the treatment plan.  8. Complete all requirements for UA's, alcohol screening tests and other testing.  9. Schedule a physical examination if recommended.    In addition to the above, complete all individual goals as specifically outlines on your treatment plan.    Criteria for discharge:  Patients/clients are discharged from the program following completion of the entire program including Phase I and II or acceptance of other post-treatment referrals such as penitentiary house, or aftercare at other facilities.  Patients/clients may also be discharged for inappropriate behavior or chemical use.      Favorable Discharge - Patients/clients have completed agreed upon treatment goals, understand their diagnosis and appear motivated about the follow-up care.    Guarded Discharge - Patients/clients have demonstrated some understanding of their diagnosis and recovery process, and have completed some of their treatment goals.  This prognosis also includes patients/clients who have completed some treatment goals but have not made  commitment to community support or follow through with referrals.    Unfavorable Discharge - Patients/clients have not completed agreed upon treatment goals due to their own choice, have limited understanding of their diagnosis, and have shown minimal or inconsistent behavior conducive to recovery.  Those patients/clients discharged due to behavioral problems will also be unfavorable discharges.                                  Adult CD Treatment Plan     Samuel Cowan [id number]   5/6/2020 24 year old female        ------------------------------------------------------------------------------------------------------------------  Acute Intoxication/Withdrawal Potential     DIMENSION 1  RISK FACTOR: 0      SUBSTANCE USE DISORDERS:  Alcohol Use Disorder Severe - 303.90 (F10.20)  Cannabis Use Disorder Moderate - 304.30 (F12.20)             Assignment Date Source Prob/Goal/  Intervention Target  Date Initials Outcome Completion  Date   5/6/2020 Self -  Current History -  Current Assessment -  Current  Area of Treatment Focus: Substance use, cravings and urges.  Last use date was reported as 04/23/2020.       Goal:   Develop effective strategies to maintain sobriety.      Treatment Strategies:  (Note: Must indicate the amount and frequency for each strategy)    Report to counselor and group any alcohol or drug use.                            JK {Adult CD Outcome:[id number]} ***         Biomedical Conditions and Complaints       DIMENSION 2  RISK FACTOR: 1               Assignment Date Source Prob/Goal/  Intervention Target  Date Initials Outcome Completion  Date   5/6/2020 Self -  Current History -  Current Assessment -  Current  Area of Treatment Focus: None    Goal:   Obtain a medical evaluation.      Disclose CD status to medical providers and follow up with medical interventions while in IOP.   Maintain good physical health.    Treatment Strategies:  (Note: Must indicate the amount and frequency for each  strategy)    Report change in severity of symptoms to staff.     Continue to take prescribed medications and follow-up with medical interventions while in program.    Exercise at least 30 minutes/3 times per week.                                             Ongoing through  9/17/20    Ongoing through  9/17/20          Ongoing through  9/17/20     QUINN  ***       -----------------------------------------------------------------------------------------------------------------    Emotional/Behavioral/Cognitive Conditions and Complications       DIMENSION 3  RISK FACTOR: 2               Assignment Date Source Prob/Goal/  Intervention Target  Date Initials Outcome Completion  Date   5/6/2020 Self -  Current History -  Current Assessment -  Current   Area of Treatment Focus:  Client lacks understanding of addiction as a disease and how this disorder affects other aspects of mental and physical health.     Goal:   Learn how to apply self-care and psychotherapy to build a repertoire of daily habits that counteract PAWS, fatigue, depression and anxiety leading to increased resiliency and well-being.      Treatment Strategies:  Attend each of the following groups one time a week and complete all 6 by due date, unless excused by primary counselor.  All group work to be completed each session to receive an effective outcome.      > Neurobiology of addiction: Informs client of brain changes and reduces feelings of shame, instructs on steps to help heal     > Learn and use Mindfulness to facilitate effective action in problem solving and promote health and well-being      > Mental Health Part 1: Learn 3 core processes of Acceptance,   Cognitive De-fusion, and Being Present     > Mental Health Part 2: Learn 3 core processes of Self-as-Context, Values, and Committed Action      > Learn and apply Self-Care in a variety of areas to improve mental and physical health, reduce PAWS, and increase feelings of self-empowerment, resiliency  and well-being     > Learn Stress Management techniques to reduce PAWS and stress-related symptoms, increase resiliency, and learn healthy routines to replace dysfunctional habits      Areas of Treatment:   Assessment by both counselor and therapist to determine if a MH Diagnostic Assessment is recommended for this client in order to enhance client outcomes in this treatment program.     Goal:   If DA is recommended, therapist will set up appt for DA with client and follow up on individual therapy if indicated.   If DA is not recommended, client will continue to be assessed for any signs of MH symptoms needing to be addressed during this treatment and counselor/therapist will follow up as determined.      Treatment Strategies:  TBD, see above      KIRK BRADLEY                  SK                                                                                                                                                                 [Absent]  Effective             Effective               Effective                 Effective               [Excused]  Effective                   Effective                                                                        [06/09/20]  07/21/20 05/05/20 05/12/20 05/19/20 6/23/2020            [05/26/20]  07/07/20 06/02/20      -------------------------------------------------------------------------------------------------------------------     Readiness to Change       DIMENSION 4  RISK FACTOR: 1               Assignment Date Source Prob/Goal/  Intervention Target  Date Initials Outcome Completion  Date   5/6/2020 Self -  Current History -  Current Assessment -  Current  Area of Treatment Focus: {Client/Patient lacks internal motivation to stop using substances.      Goal:      Understand  the impact your substance use has had on you, your family, and significant relationships.    Increase internal motivation to change.    Treatment Strategies:  (Note: Must indicate the amount and frequency for each strategy)     Present 10 [place] assignment.  Once.  First half:    Second half:       Each week write down 3 reasons you want to remain sober.     Complete Symptoms assignment. Once.                                                       By: 5/21/20 5/28/20    Ongoing through  9/17/20 6/18/20   QUINN BALL    JHEBERT                                                         Effective    Effective                                                                       5/13/20 5/14/20       -------------------------------------------------------------------------------------------------------------------     Relapse/Continues Use/Continues Problem Potential       DIMENSION 5  RISK FACTOR: 3                   Assignment Date Source Prob/Goal/  Intervention Target  Date Initials Outcome Completion  Date   5/6/2020 Self -  Current History -  Current Assessment -  Current  Area of Treatment Focus: Lacks a daily routine that includes healthy and sober living skills.    and Client/Patient does not recognize relapse triggers and warning signs.       Goal:     Identify personal triggers and relapse warning signs.      Develop sober coping and living skills in order to address the development of a strategy for long term recovery.    Treatment Strategies:  (Note: Must indicate the amount and frequency for each strategy)     Develop a relapse prevention plan including lifestyle changes, recovery activities, daily structure, self-care, support systems, spirituality, work, finances, recreation and crisis management. Once.                                                             By:  7/9/20 QUINN  ***     Recovery Environment       DIMENSION 6  RISK FACTOR: 2      "              AssignmentDate Source Prob/Goal/  Intervention Target  Date Initials Outcome Completion  Date   5/6/2020 Self -  Current History -  Current Assessment -  Current  Area of Treatment Focus: Lacks sober support network.       Goal:   Increase sober support network. Identify and attend sober support group meetings.    Treatment Strategies:  (Note: Must indicate the amount and frequency for each strategy)     Complete the personal \"Recovery Care Assignment\" Once.                                     By:  9/10/20   JK  ***     Individual abuse prevention plan (required for lodging plus) : specific actions, referral:   No additional protection measures required other than the Program Abuse Prevention Plan - The program's individual abuse prevention plan (IAPP) is sufficient for this client.      All interventions that are designated as current will need to be completed in order to transition out of treatment with a favorable prognosis. The treatment plan is a flexible document and a work in progress. Interventions and goals may be added at any time to customize this plan to each individual's needs. Client may work with clinician to change interventions as long as they pertain to the goals stipulated in the plan and/or are clinically driven.    AMENA Subramanian    Acknowledgement of Current Treatment Plan - Initial Treatment Plan     INITIAL TREATMENT PLAN:     1. I have participated in creating my treatment plan with my therapist / counselor on 5/6/2020     I agree with the plan as it is written in the electronic health record.    Name Signature/Date   Samuel Cowan    Name of Therapist / Counselor Signature/Date   AMENA Subramanian      2. I have completed and reviewed my Safety Plan with my counselor and signed this on 5/6/20. I have been given the hard copy of this plan.    Patient signature/date:      ___________________________________________________________________5/6/2020    3. Last Use Date: " As Client reported: 04/23/2020    Patient signature/date:     ____________________________________________________________________5/6/2020

## 2020-05-07 ENCOUNTER — HOSPITAL ENCOUNTER (OUTPATIENT)
Dept: BEHAVIORAL HEALTH | Facility: CLINIC | Age: 25
End: 2020-05-07
Attending: FAMILY MEDICINE
Payer: COMMERCIAL

## 2020-05-07 PROCEDURE — H2035 A/D TX PROGRAM, PER HOUR: HCPCS | Mod: HQ,GT,95

## 2020-05-07 NOTE — PROGRESS NOTES
Samuel JEAN Chapo  [id number]    Treatment Plan Review     Total # of Phase 1 Group Sessions: 3 through 5/7     Total # of Phase 2 Group Sessions:   Total # of Phase 3 Group Sessions:   Total # of 1:1 Sessions: 1    Support group attended this week: no    Reporting sobriety: Yes    Treatment Plan Review completed on:  5/7/2020     Projected discharge date: 9/17/20    Client preferred learning style: by reading, by hands-on practice and by watching someone else demonstrate    Staff member(s) contributing: Arnold Vasquez [facility]     Received supervision: No.    Client involvement with treatment planning: contributed to goals and plan.    Client received copy of plan/revised plan: Yes    Client agrees with plan/revised plan: Yes    Changes to Treatment Plan: No    New Goals added since last review: None.    Goal(s) worked on since last review: D4, D5 and D6. Clients in group discussion, self-assessed themselves on their level of motivation, risks to staying motivated and discussed ways/strategies they could maintain their motivation for long term recovery. Clients agreed to practice specific self-care techniques and report to group. Client participated in presentation and group discussion of Mindfulness practices.     Strategies effective: Yes    Treatment Coordination Activities: None.    Medical, Mental Health and other appointments the client attended: None.    Medication issues: None.    Physical and mental health problems: Yes - Client has thoughts of suicide or harm to others without means; however, the thoughts may interfere with participation in some treatment activities. Client has difficulty functioning in significant life areas. Client has moderate symptoms of emotional, behavioral, or cognitive problems. .    Review and evaluation of the individual abuse prevention plan: The program's individual abuse prevention plan (IAPP) is sufficient for this client.     Substance Use Disorders:    Alcohol Use Disorder  Severe - 303.90 (F10.20)  Cannabis Use Disorder Moderate - 304.30 (F12.20)    ASAM Risk Rating: (Note the rationale for risk rating changes)  Dimension 1 0 Client displays full functioning. No signs or symptoms of intoxication or withdrawal. Client currently rates desire to use substances at 2/10.     Dimension 2 1 The patient reported being in good overall physical health and she denied having any history of chronic medical problems.  The patient denied taking any prescription or OTC medications at this time.      Dimension 3 2 Client has difficulty with impulse control and lacks coping skills. Client has thoughts of suicide or harm to others without means; however, the thoughts may interfere with participation in some treatment activities. Client has difficulty functioning in significant life areas. Client has moderate symptoms of emotional, behavioral, or cognitive problems. Client is able to participate in most treatment activities.    Dimension 4 1 Client is motivated with active reinforcement, to explore treatment and strategies for change, but ambivalent about illness or need for change.    Dimension 5 3 Client has poor recognition and understanding of relapse and recidivism issues and displays moderately high vulnerability for further substance use or mental health problems. Client has few coping skills and rarely applies coping skills.       Dimension 6 2 Patient lives with her parents and her two children. Patient is employed, working 25-30 hours per week.  Patient reports she is in a romantic relationship, not concerned about his substance use. Patient reports having 2 children minor children who live with her. Patient denies any past or current legal issues.      Client will participate virtual sober meetings, make calls to group peers for support and practice self-care.    Arnold Vasquez [facility]

## 2020-05-07 NOTE — PROGRESS NOTES
Patient:  Samuel Cowan    Date: May 7, 2020    Comprehensive Assessment UPDATE        Comprehensive Summary Update and Review  Counselor met with patient on 5/7/2020 and reviewed the Comprehensive Assessment.    There were no changes/updates identified by patient or in chart entries.        [place] Suicide Severity Rating Reassessment    Have you ever wished you were dead or that you could go to sleep and not wake up? Past Month?  YES       Have you actually had any thoughts of killing yourself?   Past Month? YES    Have you been thinking about how you might do this?   Past Month?  No  Lifetime?   No     Have you had these thoughts and had some intention of acting on them?   Past Month?  No  Lifetime?   No     Have you started to work out the details of how to kill yourself?  Past Month?  No  Lifetime?   No     Do you intend to carry out this plan?   No     When you have the thoughts how long do they last?   Fleeting - few seconds or minutes    Are there things - anyone or anything (ie Family, Scientology, pain of death) that stopped you from wanting to die or acting on thoughts of suicide?   Protective factors definitely stopped you from attempting suicide        2008  [employer]  Used with permission by Mora Oquendo, PhD.

## 2020-05-07 NOTE — GROUP NOTE
Group Therapy Documentation    PATIENT'S NAME: Samuel Cowan  MRN:   [id number]  :   1995  ACCT. NUMBER: [id number]  DATE OF SERVICE: 20  START TIME:  5:30 PM  END TIME:  7:30 PM  FACILITATOR(S): Arnold Vasquez LADC  TOPIC: BEH Group Therapy  Number of patients attending the group:  9  Group Length:  2 Hours    Group Therapy Type: Addiction    Summary of Group / Topics Discussed:    Coping Skills/Lifestyle Managemet      Group Attendance:  Attended group session    Patient's response to the group topic/interactions:  cooperative with task and discussed personal experience with topic    Patient appeared to be Attentive and Engaged.        Client specific details:  Client explored and discussed in group how their use had damaged Trus  and how to rebuild Trust and find Hope in early recovery.    Telemedicine Visit: The patient's condition can be safely assessed and treated via synchronous audio and visual telemedicine encounter.      Reason for Telemedicine Visit: Services only offered telehealth    Originating Site (Patient Location): Patient's home    Distant Site (Provider Location): Provider Remote Setting    Consent:  The patient/guardian has verbally consented to: the potential risks and benefits of telemedicine (video visit) versus in person care; bill my insurance or make self-payment for services provided; and responsibility for payment of non-covered services.     Mode of Communication:  Video Conference via [employer]    As the provider I attest to compliance with applicable laws and regulations related to telemedicine.

## 2020-05-07 NOTE — PROGRESS NOTES
Initial Services Plan        Service Initiation Date: 5/7/2020    Immediate health and/or safety concerns: No    Identify health and safety concern(s) below and include plan to address:    None Identified    Treatment suggestions for client during the time between intake (admit date) and completion of the individual treatment plan:     Introduce yourself to your treatment group. Spend time getting to know your peers    Completed by: AMENA Subramanian  Date completed: 5/7/2020 at 12:20 PM

## 2020-05-11 ENCOUNTER — HOSPITAL ENCOUNTER (OUTPATIENT)
Dept: BEHAVIORAL HEALTH | Facility: CLINIC | Age: 25
End: 2020-05-11
Attending: FAMILY MEDICINE
Payer: COMMERCIAL

## 2020-05-11 PROCEDURE — H2035 A/D TX PROGRAM, PER HOUR: HCPCS | Mod: HQ,GT

## 2020-05-11 NOTE — GROUP NOTE
Group Therapy Documentation    PATIENT'S NAME: Samuel Cowan  MRN:   [id number]  :   1995  ACCT. NUMBER: [id number]  DATE OF SERVICE: 20  START TIME:  5:30 PM  END TIME:  8:30 PM  FACILITATOR(S): Arnold Vasquez LADC  TOPIC: BEH Group Therapy  Number of patients attending the group:  8  Group Length:  3 Hours    Group Therapy Type: Addiction    Summary of Group / Topics Discussed:    Coping Skills/Lifestyle Managemet      Group Attendance:  Attended group session    Patient's response to the group topic/interactions:  cooperative with task    Patient appeared to be Engaged.        Client specific details: Client specific details:  Client worked on strength identification in group      Telemedicine Visit: The patient's condition can be safely assessed and treated via synchronous audio and visual telemedicine encounter.      Reason for Telemedicine Visit: Services only offered telehealth    Originating Site (Patient Location): Patient's home    Distant Site (Provider Location): Provider Remote Setting    Consent:  The patient/guardian has verbally consented to: the potential risks and benefits of telemedicine (video visit) versus in person care; bill my insurance or make self-payment for services provided; and responsibility for payment of non-covered services.     Mode of Communication:  Video Conference via [employer]    As the provider I attest to compliance with applicable laws and regulations related to telemedicine.

## 2020-05-11 NOTE — PROGRESS NOTES
Samuel JEAN Chapo  [id number]    Treatment Plan Review     Total # of Phase 1 Group Sessions: 3 through 5/7     Total # of Phase 2 Group Sessions:   Total # of Phase 3 Group Sessions:   Total # of 1:1 Sessions: 1    Support group attended this week: no    Reporting sobriety: Yes    Treatment Plan Review completed on:  5/7/2020     Projected discharge date: 9/17/20    Client preferred learning style: by reading, by hands-on practice and by watching someone else demonstrate    Staff member(s) contributing: Arnold Vasquez [facility]     Received supervision: No.    Client involvement with treatment planning: contributed to goals and plan.    Client received copy of plan/revised plan: Yes    Client agrees with plan/revised plan: Yes    Changes to Treatment Plan: No    New Goals added since last review: None.    Goal(s) worked on since last review: D4, D5 and D6. Clients in group discussion, self-assessed themselves on their level of motivation, risks to staying motivated and discussed ways/strategies they could maintain their motivation for long term recovery. Clients agreed to practice specific self-care techniques and report to group. Client participated in presentation and group discussion of Mindfulness practices.     Strategies effective: Yes    Treatment Coordination Activities: None.    Medical, Mental Health and other appointments the client attended: None.    Medication issues: None.    Physical and mental health problems: Yes - Client has thoughts of suicide or harm to others without means; however, the thoughts may interfere with participation in some treatment activities. Client has difficulty functioning in significant life areas. Client has moderate symptoms of emotional, behavioral, or cognitive problems. .    Review and evaluation of the individual abuse prevention plan: The program's individual abuse prevention plan (IAPP) is sufficient for this client.     Substance Use Disorders:    Alcohol Use Disorder  Severe - 303.90 (F10.20)  Cannabis Use Disorder Moderate - 304.30 (F12.20)    ASAM Risk Rating: (Note the rationale for risk rating changes)  Dimension 1 0 Client displays full functioning. No signs or symptoms of intoxication or withdrawal. Client currently rates desire to use substances at 2/10.     Dimension 2 1 The patient reported being in good overall physical health and she denied having any history of chronic medical problems.  The patient denied taking any prescription or OTC medications at this time.      Dimension 3 2 Client has difficulty with impulse control and lacks coping skills. Client has thoughts of suicide or harm to others without means; however, the thoughts may interfere with participation in some treatment activities. Client has difficulty functioning in significant life areas. Client has moderate symptoms of emotional, behavioral, or cognitive problems. Client is able to participate in most treatment activities.    Dimension 4 1 Client is motivated with active reinforcement, to explore treatment and strategies for change, but ambivalent about illness or need for change.    Dimension 5 3 Client has poor recognition and understanding of relapse and recidivism issues and displays moderately high vulnerability for further substance use or mental health problems. Client has few coping skills and rarely applies coping skills.       Dimension 6 2 Patient lives with her parents and her two children. Patient is employed, working 25-30 hours per week.  Patient reports she is in a romantic relationship, not concerned about his substance use. Patient reports having 2 children minor children who live with her. Patient denies any past or current legal issues.      Client will participate virtual sober meetings, make calls to group peers for support and practice self-care.    Arnold Vasquez [facility]

## 2020-05-11 NOTE — PROGRESS NOTES
Comprehensive Assessment Summary     Based on client interview, review of previous assessments and   comprehensive assessment interview the following diagnosis and recommendations are:     Patient: Samuel Cowan  MRN; [id number]   : 1995  Age: 24 year old Sex: female       Client meets criteria for:  Alcohol Use Disorder Severe - 303.90 (F10.20)  Cannabis Use Disorder Moderate - 304.30 (F12.20)  Dimension One: Acute Intoxication/Withdrawal Potential     Ratin  (Consider the client's ability to cope with withdrawal symptoms and current state of intoxication) Client displays full functioning with good ability to tolerate and cope with withdrawal discomfort. No signs or symptoms of intoxication or withdrawal or resolving signs or symptoms.      Dimension Two: Biomedical Condition and Complications    Ratin  (Consider the degree to which any physical disorder would interfere with treatment for substance abuse, and the client's ability to tolerate any related discomfort; determine the impact of continued chemical use on the unborn child if the client is pregnant)   The patient reported being in good overall physical health and she denied having any history of chronic medical problems.  The patient denied taking any prescription or OTC medications at this time.      Dimension Three: Emotional/Behavioral/Cognitive Conditions & Complications  Ratin  (Determine the degree to which any condition or complications are likely to interfere with treatment for substance abuse or with functioning in significant life areas and the likelihood of risk of harm to self or others)   Patient reports the above mental health diagnosis. Patient denied current mental health providers. Patient reports a history of mental health medications, has been taking them in the MH unit. Patient reported a history of trauma and/or abuse. Patient denied suicidal and self-injurious ideation and intent at this time. Pt denied  suicide attempts in the past.    Dimension Four: Treatment Acceptance/Resistance     Ratin  (Consider the amount of support and encouragement necessary to keep the client involved in treatment) Client is motivated with active reinforcement, to explore treatment and strategies for change, but ambivalent about illness or need for change.      Dimension Five: Continued Use/Relaspe Prevention     Rating:  3  (Consider the degree to which the client's recognizes relapse issues and has the skills to prevent relapse of either substance use or mental health problems)     The patient appears to lack sober coping skills and she lacks long-term sober maintenance skills.  The patient has dual issues of mental health and substance abuse.      Dimension Six: Recovery Environment     Ratin  (Consider the degree to which key areas of the client's life are supportive of or antagonistic to treatment participation and recovery)   Patient is employed, working 25-30 hours per week.  Patient reports she is in a romantic relationship, not concerned about his substance use. Patient reports having 2 children minor children who live with her mother.    I have reviewed the information on the assessment, psychosocial and medical history and checklist:        it is current

## 2020-05-12 ENCOUNTER — HOSPITAL ENCOUNTER (OUTPATIENT)
Dept: BEHAVIORAL HEALTH | Facility: CLINIC | Age: 25
End: 2020-05-12
Attending: FAMILY MEDICINE
Payer: COMMERCIAL

## 2020-05-12 PROCEDURE — H2035 A/D TX PROGRAM, PER HOUR: HCPCS | Mod: HQ,GT,95 | Performed by: SOCIAL WORKER

## 2020-05-13 ENCOUNTER — HOSPITAL ENCOUNTER (OUTPATIENT)
Dept: BEHAVIORAL HEALTH | Facility: CLINIC | Age: 25
End: 2020-05-13
Attending: FAMILY MEDICINE
Payer: COMMERCIAL

## 2020-05-13 PROCEDURE — H2035 A/D TX PROGRAM, PER HOUR: HCPCS | Mod: HQ,GT,95

## 2020-05-13 NOTE — GROUP NOTE
Group Therapy Documentation    PATIENT'S NAME: Samuel Cowan  MRN:   [id number]  :   1995  ACCT. NUMBER: [id number]  DATE OF SERVICE: 20  START TIME:  5:30 PM  END TIME:  8:30 PM  FACILITATOR(S): Gabriela Garcia LICSW  TOPIC: BEH Group Therapy  Number of patients attending the group:  4  Group Length:  3 Hours    Group Therapy Type: Addiction    Summary of Group / Topics Discussed:    Psychoeducation/Skills Cognitive Defusion and Acceptance (ACT & CBT) IOP  Clients learn key concepts of traditional CBT and build on these by learning three core concepts of third wave therapies (Acceptance, Being Present, Cognitive Defusion) and how to apply these in recovery.     Objective(s):    Increase psychological flexibility  by changing clients  relationships with negative thoughts and emotions and build resilience to cravings and negative moods    Practice 2 skills to detach from negative thoughts and emotions, observe them in action and then choose behaviors that serve clients  personal value system.    Structure (modalities, homework, worksheets, etc):    Psychoeducation on evolution to CBT    Demonstrate and practice in Liquid Mood meditation    Explore Self-Acceptance with positive statements to use each day     Handout on practicing Acceptance    Handout on Cognitive Defusion with thoughts and emotions     Visualizations for cognitive defusion and sitting with emotions    Expected therapeutic outcome(s):     Reduction of ruminating thoughts and enhanced emotional stability    Detachment from negative emotions in order to use these constructively (messengers)     Therapeutic outcome(s) measured by:   Teachback and Group Review and Evaluation  Telemedicine Visit: The patient's condition can be safely assessed and treated via synchronous audio and visual telemedicine encounter.      Reason for Telemedicine Visit: Patient has requested telehealth visit and Services only offered telehealth     Originating  Site (Patient Location): Patient's home     Distant Site (Provider Location): Provider Remote Setting     Consent:  The patient/guardian has verbally consented to: the potential risks and benefits of telemedicine (video visit) versus in person care; bill my insurance or make self-payment for services provided; and responsibility for payment of non-covered services.      Mode of Communication:  Video Conference via [employer]     As the provider I attest to compliance with applicable laws and regulations related to telemedicine.      Group Attendance:  Attended group session    Patient's response to the group topic/interactions:  expressed readiness to alter behaviors and expressed understanding of topic    Patient appeared to be Attentive and Engaged.        Client specific details:  Samuel reported nothing new this week but she is planning on moving into a new home on Friday, which is a positive move for her. She did not see her journal in her email so I resent this to her today and explained the philosophy behind it. She meditates every day and her stress is at3 and she has not had cravings this week. She will practice feeling her emotions this week and and oberving her thoughts through her mindfulness practice.

## 2020-05-13 NOTE — GROUP NOTE
Group Therapy Documentation    PATIENT'S NAME: Samuel Cowan  MRN:   [id number]  :   1995  ACCT. NUMBER: [id number]  DATE OF SERVICE: 20  START TIME:  5:30 PM  END TIME:  8:30 PM  FACILITATOR(S): Arnold Vasquez LADC  TOPIC: BEH Group Therapy  Number of patients attending the group:  7  Group Length:  3 Hours    Group Therapy Type: Addiction    Summary of Group / Topics Discussed:    Mindfulness and Thinking Errors/Negative Self-Talk      Group Attendance:  Attended group session    Patient's response to the group topic/interactions:  cooperative with task and discussed personal experience with topic    Patient appeared to be Actively participating.        Client specific details:  Client self assessed and explored strategies to change the way they thinkk      Telemedicine Visit: The patient's condition can be safely assessed and treated via synchronous audio and visual telemedicine encounter.      Reason for Telemedicine Visit: Services only offered telehealth    Originating Site (Patient Location): Patient's home    Distant Site (Provider Location): Provider Remote Setting    Consent:  The patient/guardian has verbally consented to: the potential risks and benefits of telemedicine (video visit) versus in person care; bill my insurance or make self-payment for services provided; and responsibility for payment of non-covered services.     Mode of Communication:  Video Conference via [employer]    As the provider I attest to compliance with applicable laws and regulations related to telemedicine.

## 2020-05-14 ENCOUNTER — HOSPITAL ENCOUNTER (OUTPATIENT)
Dept: BEHAVIORAL HEALTH | Facility: CLINIC | Age: 25
End: 2020-05-14
Attending: FAMILY MEDICINE
Payer: COMMERCIAL

## 2020-05-14 PROCEDURE — H2035 A/D TX PROGRAM, PER HOUR: HCPCS | Mod: HQ,GT,95

## 2020-05-14 NOTE — GROUP NOTE
Group Therapy Documentation    PATIENT'S NAME: Samuel Cowan  MRN:   [id number]  :   1995  ACCT. NUMBER: [id number]  DATE OF SERVICE: 20  START TIME:  5:30 PM  END TIME:  7:30 PM  FACILITATOR(S): Arnold Vasquez LADC  TOPIC: BEH Group Therapy  Number of patients attending the group:  7  Group Length:  2 Hours    Group Therapy Type: Addiction    Summary of Group / Topics Discussed:    Mindfulness and Stages of Change      Group Attendance:  Attended group session    Patient's response to the group topic/interactions:  cooperative with task and expressed understanding of topic    Patient appeared to be Engaged.        Client specific details:  Client specific details:  Client participated in group discussion on what mindfulness is, how it can help address anxiety and depression and ways to practice.    Telemedicine Visit: The patient's condition can be safely assessed and treated via synchronous audio and visual telemedicine encounter.      Reason for Telemedicine Visit: Services only offered telehealth    Originating Site (Patient Location): Patient's home    Distant Site (Provider Location): Provider Remote Setting    Consent:  The patient/guardian has verbally consented to: the potential risks and benefits of telemedicine (video visit) versus in person care; bill my insurance or make self-payment for services provided; and responsibility for payment of non-covered services.     Mode of Communication:  Video Conference via [employer]    As the provider I attest to compliance with applicable laws and regulations related to telemedicine.

## 2020-05-19 ENCOUNTER — HOSPITAL ENCOUNTER (OUTPATIENT)
Dept: BEHAVIORAL HEALTH | Facility: CLINIC | Age: 25
End: 2020-05-19
Attending: FAMILY MEDICINE
Payer: COMMERCIAL

## 2020-05-19 PROCEDURE — H2035 A/D TX PROGRAM, PER HOUR: HCPCS | Mod: HQ,GT | Performed by: SOCIAL WORKER

## 2020-05-20 ENCOUNTER — HOSPITAL ENCOUNTER (OUTPATIENT)
Dept: BEHAVIORAL HEALTH | Facility: CLINIC | Age: 25
End: 2020-05-20
Attending: FAMILY MEDICINE
Payer: COMMERCIAL

## 2020-05-20 PROCEDURE — H2035 A/D TX PROGRAM, PER HOUR: HCPCS | Mod: HQ,GT,95

## 2020-05-20 NOTE — GROUP NOTE
Group Therapy Documentation    PATIENT'S NAME: Samuel Cowan  MRN:   [id number]  :   1995  ACCT. NUMBER: [id number]  DATE OF SERVICE: 20  START TIME:  5:30 PM  END TIME:  8:30 PM  FACILITATOR(S): Arnold Vasquez LADC  TOPIC: BEH Group Therapy  Number of patients attending the group:  7  Group Length:  3 Hours    Group Therapy Type: Addiction    Summary of Group / Topics Discussed:    Coping Skills/Lifestyle Managemet      Group Attendance:  Attended group session    Patient's response to the group topic/interactions:  cooperative with task, discussed personal experience with topic and expressed readiness to alter behaviors    Patient appeared to be Actively participating, Attentive and Engaged.        Client specific details: Clients explored their ambivalence to making change. Discussed and self-assessed stage of change.     Telemedicine Visit: The patient's condition can be safely assessed and treated via synchronous audio and visual telemedicine encounter.      Reason for Telemedicine Visit: Services only offered telehealth    Originating Site (Patient Location): Patient's home    Distant Site (Provider Location): Provider Remote Setting    Consent:  The patient/guardian has verbally consented to: the potential risks and benefits of telemedicine (video visit) versus in person care; bill my insurance or make self-payment for services provided; and responsibility for payment of non-covered services.     Mode of Communication:  Video Conference via [employer]    As the provider I attest to compliance with applicable laws and regulations related to telemedicine.

## 2020-05-20 NOTE — GROUP NOTE
Group Therapy Documentation    PATIENT'S NAME: Samuel Cowan  MRN:   [id number]  :   1995  ACCT. NUMBER: [id number]  DATE OF SERVICE: 20  START TIME:  5:30 PM  END TIME:  8:30 PM  FACILITATOR(S): Gabriela Garcia LICSW  TOPIC: BEH Group Therapy  Number of patients attending the group:  3  Group Length:  3 Hours    Group Therapy Type: Addiction    Summary of Group / Topics Discussed:    Psychoeducation/Skills Values, Connection and Committee Action (IOP)  Clients learn key concepts of traditional CBT and build on these by learning three core concepts of third wave therapies (Values, Committed Action, and Self-as-Context) and how to apply these in recovery.     Objective(s):    Identify 2 reasons it is important to re-identify and prioritize values that guide behaviors.    Identify 2 ways that the escalation of  addiction may change previous values-based behaviors in negative ways    Identify 3 positive impacts of making connections socially, environmentally, and within the community     Structure (modalities, homework, worksheets, etc.):    Group activity-mapping psychological flexibility     Excerpts from documentary on importance of connections    Group activity-brainstorming universal and personal values     Values worksheet with Value, Goal, Step to take this week    Expected therapeutic outcome(s):     Reinstatement of values that may have been overlooked in active addiction    Reduction of guilt and remorse     Increased satisfaction in relationships and connections    Improved mood and sense of belonging     Therapeutic outcome(s) measured by:   Teachback and Group Review and Evaluation form.  Telemedicine Visit: The patient's condition can be safely assessed and treated via synchronous audio and visual telemedicine encounter.      Reason for Telemedicine Visit: Services only offered telehealth     Originating Site (Patient Location): Patient's home     Distant Site (Provider Location):  "Provider Remote Setting     Consent:  The patient/guardian has verbally consented to: the potential risks and benefits of telemedicine (video visit) versus in person care; bill my insurance or make self-payment for services provided; and responsibility for payment of non-covered services.      Mode of Communication:  Video Conference via [employer]     As the provider I attest to compliance with applicable laws and regulations related to telemedicine.      Group Attendance:  Attended group session    Patient's response to the group topic/interactions:  discussed personal experience with topic, expressed readiness to alter behaviors and expressed understanding of topic    Patient appeared to be Actively participating.        Client specific details:  Samuel participated more this week and appeared to be more comfortable in the group video setting. She moved to a new apartment yesterday and is tired but very happy to be there. She is doing her journal and mindfulness daily. Her stress is at 4 and she had some cravings during her move and said \"I focused and occupied my brain.\" She has a lot of changes to adapt to at this time and said she is wanting to \"Keep my fozia.\" Her value to focus on this week is Honesty and she wants to put more of this into all her relationships, especially with her partner. She identified barriers as well as how to overcome them.   "

## 2020-05-21 ENCOUNTER — HOSPITAL ENCOUNTER (OUTPATIENT)
Dept: BEHAVIORAL HEALTH | Facility: CLINIC | Age: 25
End: 2020-05-21
Attending: FAMILY MEDICINE
Payer: COMMERCIAL

## 2020-05-21 PROCEDURE — H2035 A/D TX PROGRAM, PER HOUR: HCPCS | Mod: HQ,GT

## 2020-05-21 NOTE — GROUP NOTE
Group Therapy Documentation    PATIENT'S NAME: Samuel Cowan  MRN:   [id number]  :   1995  ACCT. NUMBER: [id number]  DATE OF SERVICE: 20  START TIME:  5:30 PM  END TIME:  7:30 PM  FACILITATOR(S): Arnold Vasquez LADC  TOPIC: BEH Group Therapy  Number of patients attending the group:  6  Group Length:  2 Hours    Group Therapy Type: Psychoeducation    Summary of Group / Topics Discussed:    Psychoeducation/Skills Readiness to change (stages of change) [ANAYA]  This topic will give a general overview of stage of change models and how they apply to the personal experience of each patient.    Objective(s):    Patient will identify at least 2 stage of change models.    Patient will identify at least 5 stages within the Prochaska-DiClemente model.    Patient will identify their own position within the model(s).    Structure:    Provide psychoeducation on readiness to change and stages of change.    Facilitate group discussion around each patient s reasons to change and current place in the model(s).    Use teach-back techniques to ensure patients  understanding.    Provide patients with handouts to enhance learning.    Expected therapeutic outcomes:      Understand change as an essential and non-linear process.    Reduce confusion about ambivalence.    Enhanced motivation to change.    Ability to maintain/return to sobriety    Therapeutic outcome(s) measured by:    Patient s ability to teach-back information learned in group.    Patient s demonstration of learning by identification of their own stage of change      Group Attendance:  Attended group session    Patient's response to the group topic/interactions:  cooperative with task and discussed personal experience with topic    Patient appeared to be Engaged.        Client specific details: Client identified  their own position within the change model(s) as action stage      Telemedicine Visit: The patient's condition can be safely assessed and treated via  synchronous audio and visual telemedicine encounter.      Reason for Telemedicine Visit: Services only offered telehealth    Originating Site (Patient Location): Patient's home    Distant Site (Provider Location): Provider Remote Setting    Consent:  The patient/guardian has verbally consented to: the potential risks and benefits of telemedicine (video visit) versus in person care; bill my insurance or make self-payment for services provided; and responsibility for payment of non-covered services.     Mode of Communication:  Video Conference via [employer]    As the provider I attest to compliance with applicable laws and regulations related to telemedicine.

## 2020-06-02 ENCOUNTER — HOSPITAL ENCOUNTER (OUTPATIENT)
Dept: BEHAVIORAL HEALTH | Facility: CLINIC | Age: 25
End: 2020-06-02
Attending: FAMILY MEDICINE
Payer: COMMERCIAL

## 2020-06-02 PROCEDURE — H2035 A/D TX PROGRAM, PER HOUR: HCPCS | Mod: HQ,95 | Performed by: SOCIAL WORKER

## 2020-06-03 ENCOUNTER — HOSPITAL ENCOUNTER (OUTPATIENT)
Dept: BEHAVIORAL HEALTH | Facility: CLINIC | Age: 25
End: 2020-06-03
Attending: FAMILY MEDICINE
Payer: COMMERCIAL

## 2020-06-03 PROCEDURE — H2035 A/D TX PROGRAM, PER HOUR: HCPCS | Mod: HQ,95

## 2020-06-03 NOTE — GROUP NOTE
Group Therapy Documentation    PATIENT'S NAME: Samuel Cowan  MRN:   [id number]  :   1995  ACCT. NUMBER: [id number]  DATE OF SERVICE: 20  START TIME:  5:30 PM  END TIME:  7:30 PM  FACILITATOR(S): Arnold Vasquez LADC  TOPIC: BEH Group Therapy  Number of patients attending the group:  6  Group Length:  2 Hours    Group Therapy Type: Psychoeducation    Summary of Group / Topics Discussed:    Personal Values Exploration  and Promises of Recovery      Group Attendance:  Attended group session    Patient's response to the group topic/interactions:  cooperative with task, discussed personal experience with topic and expressed readiness to alter behaviors    Patient appeared to be Actively participating.        Client specific details:  Client participated in group discussion and exploration of personal values and how this relates to developing a knowledge of their individual strengths. Client shared she has a personal strength having personal power and a value of leadership.      Telemedicine Visit: The patient's condition can be safely assessed and treated via synchronous audio and visual telemedicine encounter.      Reason for Telemedicine Visit: Services only offered telehealth    Originating Site (Patient Location): Patient's home    Distant Site (Provider Location): Provider Remote Setting    Consent:  The patient/guardian has verbally consented to: the potential risks and benefits of telemedicine (video visit) versus in person care; bill my insurance or make self-payment for services provided; and responsibility for payment of non-covered services.     Mode of Communication:  Video Conference via [employer]    As the provider I attest to compliance with applicable laws and regulations related to telemedicine.

## 2020-06-03 NOTE — GROUP NOTE
Group Therapy Documentation    PATIENT'S NAME: Samuel Cowan  MRN:   [id number]  :   1995  ACCT. NUMBER: [id number]  DATE OF SERVICE: 20  START TIME:  5:30 PM  END TIME:  8:30 PM  FACILITATOR(S): Gabriela Garcia LICSW  TOPIC: BEH Group Therapy  Number of patients attending the group:  5  Group Length:  3 Hours    Group Therapy Type: Addiction    Summary of Group / Topics Discussed:    Psychoeducation/Skills Stress Management and Addiction    Objective(s):    Identify 2 ways stress is connected to relapse in addiction    Identify 3 ways stress affects physical and mental health    Identify 3 ways to manage stress    Identify 1 healthy daily routine to commit to starting this week.     Structure (modalities, homework, worksheets, etc.):    Stress Management Handout   o Definition of Stress  o Importance of self-awareness to warning signs of stress  o Stress reduction in recovery  o Skills used to reduce stress and counteract its impact    Demonstration and practice of deep breathing exercise     Demonstration and practice of Mindfulness     Demonstration and practice of Progressive Relaxation exercise     Demonstration of Aromatherapy inhalers    Expected therapeutic outcome(s):     Decreased frequency and intensity of PAWS    Increased stamina and resiliency    Improved ability to function at optimal level     Therapeutic outcome(s) measured by:   Teachback and group review and evaluation  Telemedicine Visit: The patient's condition can be safely assessed and treated via synchronous audio and visual telemedicine encounter.      Reason for Telemedicine Visit: Services only offered telehealth     Originating Site (Patient Location): Patient's home     Distant Site (Provider Location): Provider Remote Setting     Consent:  The patient/guardian has verbally consented to: the potential risks and benefits of telemedicine (video visit) versus in person care; bill my insurance or make self-payment for  services provided; and responsibility for payment of non-covered services.      Mode of Communication:  Video Conference via [employer]     As the provider I attest to compliance with applicable laws and regulations related to telemedicine.       Group Attendance:  Attended group session    Patient's response to the group topic/interactions:  discussed personal experience with topic, expressed readiness to alter behaviors and expressed understanding of topic    Patient appeared to be Attentive and Engaged.        Client specific details:  Samuel reported she was doing okay after her use episode on 5/24/20. She was very disappointed in herself but was able to come back to the program and not give up on herself. She is forgot her journal but will start this and said her stress is 8-9 from her kids having to live elsewhere. She has restarted doing mindfulness the last few days. For this week she wants to explore only sober support groups.

## 2020-06-04 NOTE — PROGRESS NOTES
Samuel Herculescatrachito  [id number]    Treatment Plan Review     Total # of Phase 1 Group Sessions: 13 through 6/4     Total # of Phase 2 Group Sessions:   Total # of Phase 3 Group Sessions:   Total # of 1:1 Sessions: 0    Support group attended this week: no    Reporting sobriety: Yes    Treatment Plan Review completed on:  6/4/2020     Projected discharge date: 9/17/20    Client preferred learning style: by reading, by hands-on practice and by watching someone else demonstrate    Staff member(s) contributing: Arnold Vasquez [facility]     Received supervision: No.    Client involvement with treatment planning: contributed to goals and plan.    Client received copy of plan/revised plan: Yes    Client agrees with plan/revised plan: Yes    Changes to Treatment Plan: No    New Goals added since last review: None.    Goal(s) worked on since last review: D4, D5 and D6. Clients in group discussion, self-assessed themselves on their level of motivation, readiness to change, risks to their sobriety and risks to staying motivated and discussed ways/strategies they could maintain their motivation for long term recovery. Clients agreed to practice specific self-care techniques and reported results to group. Client participated in presentation, group discussion and practice of Mindfulness techniques as coping skill for stress and anxiety.    Strategies effective: Yes    Treatment Coordination Activities: None.    Medical, Mental Health and other appointments the client attended: None.    Medication issues: None.    Physical and mental health problems: Yes - Client has thoughts of suicide or harm to others without means; however, the thoughts may interfere with participation in some treatment activities. Client has difficulty functioning in significant life areas. Client has moderate symptoms of emotional, behavioral, or cognitive problems. .    Review and evaluation of the individual abuse prevention plan: The program's individual abuse  prevention plan (IAPP) is sufficient for this client.     Substance Use Disorders:    Alcohol Use Disorder Severe - 303.90 (F10.20)  Cannabis Use Disorder Moderate - 304.30 (F12.20)    ASAM Risk Rating: (Note the rationale for risk rating changes)  Dimension 1 0 Client displays full functioning. No signs or symptoms of intoxication or withdrawal. Client currently rates desire to use substances at 2/10.     Dimension 2 1 The patient reported being in good overall physical health and she denied having any history of chronic medical problems.  The patient denied taking any prescription or OTC medications at this time.      Dimension 3 2 Client has difficulty with impulse control and lacks coping skills. Client has thoughts of suicide or harm to others without means; however, the thoughts may interfere with participation in some treatment activities. Client has difficulty functioning in significant life areas. Client has moderate symptoms of emotional, behavioral, or cognitive problems. Client is able to participate in most treatment activities.    Dimension 4 1 Client is motivated with active reinforcement, to explore treatment and strategies for change, but ambivalent about illness or need for change.    Dimension 5 3 Client reported return to use episode over Memorial weekend. Client has poor recognition and understanding of relapse and recidivism issues and displays moderately high vulnerability for further substance use or mental health problems. Client has few coping skills and rarely applies coping skills.       Dimension 6 2 Patient lives with her parents and her two children. Patient is employed, working 25-30 hours per week.  Patient reports she is in a romantic relationship, not concerned about his substance use. Patient reports having 2 children minor children who live with her. Patient denies any past or current legal issues.      Client will participate virtual sober meetings, make calls to group peers for  support and practice self-care.    Arnold Vasquez, AMENA

## 2020-06-08 ENCOUNTER — HOSPITAL ENCOUNTER (OUTPATIENT)
Dept: BEHAVIORAL HEALTH | Facility: CLINIC | Age: 25
End: 2020-06-08
Attending: FAMILY MEDICINE
Payer: COMMERCIAL

## 2020-06-08 PROCEDURE — H2035 A/D TX PROGRAM, PER HOUR: HCPCS | Mod: HQ,95

## 2020-06-08 NOTE — GROUP NOTE
Group Therapy Documentation    PATIENT'S NAME: Samuel Cowan  MRN:   [id number]  :   1995  ACCT. NUMBER: [id number]  DATE OF SERVICE: 20  START TIME:  5:30 PM  END TIME:  7:30 PM  FACILITATOR(S): Arnold Vasquez LADC  TOPIC: BEH Group Therapy  Number of patients attending the group:  6  Group Length:  2 Hours    Group Therapy Type: Addiction    Summary of Group / Topics Discussed:    Strengths      Group Attendance:  Attended group session    Patient's response to the group topic/interactions:  cooperative with task and discussed personal experience with topic    Patient appeared to be Actively participating.        Client specific details:  Client participated in group discussion and exploration of personal values and how this relates to developing a knowledge of their individual strengths. Client shared he has a personal strength of honesty    Telemedicine Visit: The patient's condition can be safely assessed and treated via synchronous audio and visual telemedicine encounter.      Reason for Telemedicine Visit: Services only offered telehealth    Originating Site (Patient Location): Patient's home    Distant Site (Provider Location): Provider Remote Setting    Consent:  The patient/guardian has verbally consented to: the potential risks and benefits of telemedicine (video visit) versus in person care; bill my insurance or make self-payment for services provided; and responsibility for payment of non-covered services.     Mode of Communication:  Tele Conference via [employer]    As the provider I attest to compliance with applicable laws and regulations related to telemedicine.

## 2020-06-09 ENCOUNTER — HOSPITAL ENCOUNTER (OUTPATIENT)
Dept: BEHAVIORAL HEALTH | Facility: CLINIC | Age: 25
End: 2020-06-09
Attending: SOCIAL WORKER
Payer: COMMERCIAL

## 2020-06-09 PROCEDURE — H2035 A/D TX PROGRAM, PER HOUR: HCPCS | Mod: 95

## 2020-06-10 ENCOUNTER — HOSPITAL ENCOUNTER (OUTPATIENT)
Dept: BEHAVIORAL HEALTH | Facility: CLINIC | Age: 25
End: 2020-06-10
Attending: FAMILY MEDICINE
Payer: COMMERCIAL

## 2020-06-10 PROCEDURE — H2035 A/D TX PROGRAM, PER HOUR: HCPCS | Mod: HQ,GT

## 2020-06-10 NOTE — PROGRESS NOTES
Individual Session 6/9/20    D.  Client shared that she is wants to start exploring online meetings daily and talking with his sponsor  I. Client participated in 30 minute call to review progress towards treatment plan goals  A. Client is actively advancing his progress on goals   P. Client will come into the office for UA this week    Telemedicine Visit: The patient's condition can be safely assessed and treated via synchronous audio and visual telemedicine encounter.      Reason for Telemedicine Visit: Services only offered telehealth    Originating Site (Patient Location): Patient's home    Distant Site (Provider Location): [facility]: [place]    Consent:  The patient/guardian has verbally consented to: the potential risks and benefits of telemedicine (video visit) versus in person care; bill my insurance or make self-payment for services provided; and responsibility for payment of non-covered services.     Mode of Communication:  Tele Conference via [employer]    As the provider I attest to compliance with applicable laws and regulations related to telemedicine.

## 2020-06-11 ENCOUNTER — HOSPITAL ENCOUNTER (OUTPATIENT)
Dept: BEHAVIORAL HEALTH | Facility: CLINIC | Age: 25
End: 2020-06-11
Attending: FAMILY MEDICINE
Payer: COMMERCIAL

## 2020-06-11 PROCEDURE — H2035 A/D TX PROGRAM, PER HOUR: HCPCS | Mod: HQ,GT

## 2020-06-11 NOTE — GROUP NOTE
Group Therapy Documentation    PATIENT'S NAME: Samuel Cowan  MRN:   [id number]  :   1995  ACCT. NUMBER: [id number]  DATE OF SERVICE: 20  START TIME:  5:30 PM  END TIME:  7:30 PM  FACILITATOR(S): Arnold Vasquez LADC  TOPIC: BEH Group Therapy  Number of patients attending the group:  5  Group Length:  2 Hours    Group Therapy Type: Skills/Education    Summary of Group / Topics Discussed:    Self-Care Activities and Sleep Hygiene      Group Attendance:  Attended group session    Patient's response to the group topic/interactions:  cooperative with task and discussed personal experience with topic    Patient appeared to be Actively participating.        Client specific details:  Client identified 2 components that scored low in Personal Self-Care Assessment of 'eat regulary' and 'exercise' as evidence of needing to improve physical self-care. This relates to clients Dim 3 treatment plan goal of Learn how to apply self-care and psychotherapy to build a repertoire of daily habits that counteract PAWS, fatigue, depression and anxiety leading to increased resiliency and well-being.and are things the client can work to improve.  Telemedicine Visit: The patient's condition can be safely assessed and treated via synchronous audio and visual telemedicine encounter.      Reason for Telemedicine Visit: Services only offered telehealth    Originating Site (Patient Location): Patient's home    Distant Site (Provider Location): Provider Remote Setting    Consent:  The patient/guardian has verbally consented to: the potential risks and benefits of telemedicine (video visit) versus in person care; bill my insurance or make self-payment for services provided; and responsibility for payment of non-covered services.     Mode of Communication:  Video Conference via [employer]    As the provider I attest to compliance with applicable laws and regulations related to telemedicine.

## 2020-06-15 ENCOUNTER — HOSPITAL ENCOUNTER (OUTPATIENT)
Dept: BEHAVIORAL HEALTH | Facility: CLINIC | Age: 25
End: 2020-06-15
Attending: FAMILY MEDICINE
Payer: COMMERCIAL

## 2020-06-15 PROCEDURE — H2035 A/D TX PROGRAM, PER HOUR: HCPCS | Mod: HQ,GT

## 2020-06-16 NOTE — GROUP NOTE
Group Therapy Documentation    PATIENT'S NAME: Samuel Cowan  MRN:   [id number]  :   1995  ACCT. NUMBER: [id number]  DATE OF SERVICE: 6/15/20  START TIME:  5:30 PM  END TIME:  7:30 PM  FACILITATOR(S): Arnold Vasquez LADC  TOPIC: BEH Group Therapy  Number of patients attending the group:  6  Group Length:  2 Hours    Group Therapy Type: Addiction    Summary of Group / Topics Discussed:    Coping Skills/Lifestyle Managemet      Group Attendance:  Attended group session    Patient's response to the group topic/interactions:  cooperative with task and discussed personal experience with topic    Patient appeared to be Actively participating.        Client specific details:  Client shared that she had attended her 1st virtual support group meeting for women. She shared that she enjoyed the experience and plans to attend again this week.  This relates to clients Dim 6 treatment plan goal of Increasing sober support network.     Group was 2 hours but client had techncal difficulties and was dropped after 1.5 hours resulting in being charged for 1 hour.    Telemedicine Visit: The patient's condition can be safely assessed and treated via synchronous audio and visual telemedicine encounter.      Reason for Telemedicine Visit: Services only offered telehealth    Originating Site (Patient Location): Patient's home    Distant Site (Provider Location): Provider Remote Setting    Consent:  The patient/guardian has verbally consented to: the potential risks and benefits of telemedicine (video visit) versus in person care; bill my insurance or make self-payment for services provided; and responsibility for payment of non-covered services.     Mode of Communication:  Video Conference via [employer]    As the provider I attest to compliance with applicable laws and regulations related to telemedicine.

## 2020-06-17 NOTE — TELEPHONE ENCOUNTER
----- Message from AMENA Subramanian sent at 6/17/2020  3:33 PM CDT -----  Regarding: Schedule  For some reason NAYAN BRIGHT [[id number]] has been dropped from [id number] in [place]. Pleas add as she has used only 16 of 24 scheduled sessions.  Thanks,  Robert

## 2020-06-18 ENCOUNTER — HOSPITAL ENCOUNTER (OUTPATIENT)
Dept: BEHAVIORAL HEALTH | Facility: CLINIC | Age: 25
End: 2020-06-18
Attending: FAMILY MEDICINE
Payer: COMMERCIAL

## 2020-06-18 PROCEDURE — H2035 A/D TX PROGRAM, PER HOUR: HCPCS | Mod: HQ,GT

## 2020-06-18 NOTE — GROUP NOTE
Group Therapy Documentation    PATIENT'S NAME: Samuel Cowan  MRN:   [id number]  :   1995  ACCT. NUMBER: [id number]  DATE OF SERVICE: 20  START TIME:  5:30 PM  END TIME:  7:30 PM  FACILITATOR(S): Arnold Vasquez LADC  TOPIC: BEH Group Therapy  Number of patients attending the group:  5  Group Length:  2 Hours    Group Therapy Type: Addiction    Summary of Group / Topics Discussed:    Distress Tolerance      Group Attendance:  Attended group session    Patient's response to the group topic/interactions:  cooperative with task    Patient appeared to be Attentive and Engaged.        Client specific details: Client identified a frustration of becoming judgmental with people /places/things that typically causes triggering thoughts. This relates to clients Dim 5 treatment plan goal of Develop sober coping and living skills in order to address the development of a strategy for long term recovery AEB and agreeing to practice intentionally responding in appropriate alternative ways over the weekend and report back in group on Monday.    Telemedicine Visit: The patient's condition can be safely assessed and treated via synchronous audio and visual telemedicine encounter.      Reason for Telemedicine Visit: Services only offered telehealth    Originating Site (Patient Location): Patient's home    Distant Site (Provider Location): Provider Remote Setting    Consent:  The patient/guardian has verbally consented to: the potential risks and benefits of telemedicine (video visit) versus in person care; bill my insurance or make self-payment for services provided; and responsibility for payment of non-covered services.     Mode of Communication:  Video Conference via [employer]    As the provider I attest to compliance with applicable laws and regulations related to telemedicine.

## 2020-06-23 ENCOUNTER — HOSPITAL ENCOUNTER (OUTPATIENT)
Dept: BEHAVIORAL HEALTH | Facility: CLINIC | Age: 25
End: 2020-06-23
Attending: FAMILY MEDICINE
Payer: COMMERCIAL

## 2020-06-23 PROCEDURE — H2035 A/D TX PROGRAM, PER HOUR: HCPCS | Mod: GT

## 2020-06-24 NOTE — PROGRESS NOTES
"Samuel Cowan is a 24 year old female who is being evaluated via a billable video visit.      Telemedicine Visit: The patient's condition can be safely assessed and treated via synchronous audio and visual telemedicine encounter.      Type of Session: Individual     Reason for Telemedicine Visit: Services only offered telehealth     Originating Site (Patient Location): Patient's home     Distant Site (Provider Location): [facility] Outpatient Setting: [place]     Consent:  The patient/guardian has verbally consented to: the potential risks and benefits of telemedicine (video visit) versus in person care; bill my insurance or make self-payment for services provided; and responsibility for payment of non-covered services.      Mode of Communication:   Video Conference via [employer]        As the provider I attest to compliance with applicable laws and regulations related to telemedicine.     Data: Writer of this note met with client on 6/23/2020 for an Individual Session from 6:45 to 7:15 pm. This session focused on personal  values and how difficult it was to live by or acknowledge her personal values when drinking and how important they are for her today to live by in teo sobriety. Client reports that when she was using one of her most important values the feeling of being self empowered was lost, she expressed at the end of her using she her drinking \"Made me feel weak, like I was beat, how did I get this far?\" She shared a thing she has done to feel self empowered was to quit drinking and come to treatment and to make other changes such as giving up her relationship with a person who drank, \"it was hard but it felt good, I can be more independent.\"  She reports while drinking she stopped spending time with her kids and family. She reports that this disabled their trust in her. She reports her kids are beginning to trust her again. Client shared that this session was helpful for her today because she " "lost her grandfather just 1.5 weeks ago and that she is grieving his loss as well as the loss of her grandmother in 2009. She reports she had a special connection with both of them. Client  reports her DOLU is 5/23/2020.    Intervention: Reflection, motivational interviewing, validation, CBT. This session focused on client being given reading and definitions on values and client completing worksheets identifying her personal values then discussing how alcohol interfered with her living by her values and how in sobriety she is able to make progress towards living by her own personal values. She reports that alcohol made it impossible to live by her own personal, important life values.   Assessment: Client appears to be in the action phase of her recovery, discussion on values appeared to bring out some of her higher aspirations for her life and she was able to connect achieving her life goals with her retention of her sobriety. Client reports a special connection now with her mother who is in long term sustained recovery from addiction.    Client reports having made it to two outside sober meetings while in phase one and reports she will explore more meetings to find one she wants to return too.Her desire for sobriety appears very connected for her desire to rebuild connections and trust with her family and her culture.     Plan: Share in group within the next two weeks at least on of the activities you plan on doing with your children that has to do with \"Giving them more good memories\".      I have reviewed the note as documented above.  This accurately captures the substance of my telehealth visit with the patient.     Video visit start time: 6:45 pm  Video visit end time:  7:15 pm     Dom Mcclellan   "

## 2020-06-25 ENCOUNTER — HOSPITAL ENCOUNTER (OUTPATIENT)
Dept: BEHAVIORAL HEALTH | Facility: CLINIC | Age: 25
End: 2020-06-25
Attending: FAMILY MEDICINE
Payer: COMMERCIAL

## 2020-06-25 PROCEDURE — H2035 A/D TX PROGRAM, PER HOUR: HCPCS | Mod: HQ,95

## 2020-06-25 NOTE — GROUP NOTE
"Group Therapy Documentation    PATIENT'S NAME: Samuel Cowan  MRN:   [id number]  :   1995  ACCT. NUMBER: [id number]  DATE OF SERVICE: 20  START TIME:  5:30 PM  END TIME:  7:30 PM  FACILITATOR(S): Arnold Vasquez LADC  TOPIC: BEH Group Therapy  Number of patients attending the group:  4  Group Length:  2 Hours    Group Therapy Type: Psychoeducation    Summary of Group / Topics Discussed:    Stages Of Recovery      Group Attendance:  Attended group session    Patient's response to the group topic/interactions:  cooperative with task and discussed personal experience with topic    Patient appeared to be Attentive and Engaged.        Client specific details: After group discussion of Phases of recovery, Client shared that it feels like she must be entering P4/ The Wall where it feels difficult to motivate herself even though she has prior experience with [place] symptoms of questioning if she really had a \"problem\". This relates to Client Dim 3 treatment plan goal AEB Client identified 1 of their warning sign/triggers and have built daily habits that can build relilience.    Telemedicine Visit: The patient's condition can be safely assessed and treated via synchronous audio and visual telemedicine encounter.      Reason for Telemedicine Visit: Services only offered telehealth    Originating Site (Patient Location): Patient's home    Distant Site (Provider Location): Provider Remote Setting    Consent:  The patient/guardian has verbally consented to: the potential risks and benefits of telemedicine (video visit) versus in person care; bill my insurance or make self-payment for services provided; and responsibility for payment of non-covered services.     Mode of Communication:  Video Conference via [place]    As the provider I attest to compliance with applicable laws and regulations related to telemedicine.    "

## 2020-06-29 ENCOUNTER — HOSPITAL ENCOUNTER (OUTPATIENT)
Dept: BEHAVIORAL HEALTH | Facility: CLINIC | Age: 25
End: 2020-06-29
Attending: FAMILY MEDICINE
Payer: COMMERCIAL

## 2020-06-29 DIAGNOSIS — F19.10 SUBSTANCE ABUSE (H): ICD-10-CM

## 2020-06-29 PROCEDURE — H2035 A/D TX PROGRAM, PER HOUR: HCPCS | Mod: HQ,95

## 2020-06-29 NOTE — ADDENDUM NOTE
Encounter addended by: Gabriela Garcia, [facility] on: 6/29/2020 1:18 PM   Actions taken: Episode edited

## 2020-06-30 NOTE — GROUP NOTE
Group Therapy Documentation    PATIENT'S NAME: Samuel Cowan  MRN:   [id number]  :   1995  ACCT. NUMBER: [id number]  DATE OF SERVICE: 20  START TIME:  5:30 PM  END TIME:  7:30 PM  FACILITATOR(S): Tomy Moseley LADC  TOPIC: BEH Group Therapy  Number of patients attending the group:  9    Group Length:  2 Hours    Group Therapy Type: Addiction and weekly recovery check in    Summary of Group / Topics Discussed:    Balanced Lifestyle , Coping Skills/Lifestyle Managemet, Self-Care Activities, and weekly recovery check in. Value of sober support groups attendance on a regular basis.  Telemedicine Visit: The patient's condition can be safely assessed and treated via synchronous audio and visual telemedicine encounter.      Reason for Telemedicine Visit: Patient has requested telehealth visit    Originating Site (Patient Location): Patient's home    Distant Site (Provider Location): [facility]: [place]    Consent:  The patient/guardian has verbally consented to: the potential risks and benefits of telemedicine (video visit) versus in person care; bill my insurance or make self-payment for services provided; and responsibility for payment of non-covered services.     Mode of Communication:  Video Conference via [employer]    As the provider I attest to compliance with applicable laws and regulations related to telemedicine.        Group Attendance:  Attended group session    Patient's response to the group topic/interactions:  cooperative with task    Patient appeared to be Attentive.        Client specific details:  Client participated in check in and then her phone  during second hour of group. Client shared she is maintaining her sobriety.

## 2020-07-01 NOTE — ADDENDUM NOTE
Encounter addended by: Gabriela Garcia [facility] on: 7/1/2020 5:20 PM   Actions taken: Episode edited

## 2020-07-02 ENCOUNTER — HOSPITAL ENCOUNTER (OUTPATIENT)
Dept: BEHAVIORAL HEALTH | Facility: CLINIC | Age: 25
End: 2020-07-02
Attending: FAMILY MEDICINE
Payer: COMMERCIAL

## 2020-07-02 DIAGNOSIS — F10.10 ALCOHOL ABUSE: ICD-10-CM

## 2020-07-02 PROCEDURE — H2035 A/D TX PROGRAM, PER HOUR: HCPCS | Mod: HQ,GT

## 2020-07-03 NOTE — GROUP NOTE
"Group Therapy Documentation    PATIENT'S NAME: Samuel Cowan  MRN:   [id number]  :   1995  ACCT. NUMBER: [id number]  DATE OF SERVICE: 20  START TIME:  5:30 PM  END TIME:  7:30 PM  FACILITATOR(S): Tomy Moseley LADC  TOPIC: BEH Group Therapy  Number of patients attending the group:  9    Group Length:  2 Hours    Group Therapy Type: Psychoeducation    Summary of Group / Topics Discussed:    Distress Tolerance, Emotional Regulation, Trauma Informed Care, and \"What to expect in Early Recovery\" [place] video \"Treating ANAYA and co-occurring trauma\".     Telemedicine Visit: The patient's condition can be safely assessed and treated via synchronous audio and visual telemedicine encounter.      Reason for Telemedicine Visit: Patient has requested telehealth visit    Originating Site (Patient Location): Patient's home    Distant Site (Provider Location): [facility]: [facility]    Consent:  The patient/guardian has verbally consented to: the potential risks and benefits of telemedicine (video visit) versus in person care; bill my insurance or make self-payment for services provided; and responsibility for payment of non-covered services.     Mode of Communication:  Video Conference via [place]    As the provider I attest to compliance with applicable laws and regulations related to telemedicine.        Group Attendance:  Attended group session    Patient's response to the group topic/interactions:  confronted peers appropriately and discussed personal experience with topic    Patient appeared to be Attentive and Engaged.        Client specific details:  Client listed several potential traumatizing events in her life. Client commented on high percentage of females with history of trauma who are also diagnosed with ANAYA. .  "

## 2020-07-07 ENCOUNTER — HOSPITAL ENCOUNTER (OUTPATIENT)
Dept: BEHAVIORAL HEALTH | Facility: CLINIC | Age: 25
End: 2020-07-07
Attending: FAMILY MEDICINE
Payer: COMMERCIAL

## 2020-07-07 PROCEDURE — H2035 A/D TX PROGRAM, PER HOUR: HCPCS | Mod: HQ,95 | Performed by: SOCIAL WORKER

## 2020-07-08 ENCOUNTER — HOSPITAL ENCOUNTER (OUTPATIENT)
Dept: BEHAVIORAL HEALTH | Facility: CLINIC | Age: 25
End: 2020-07-08
Attending: FAMILY MEDICINE
Payer: COMMERCIAL

## 2020-07-08 DIAGNOSIS — F19.10 SUBSTANCE ABUSE (H): ICD-10-CM

## 2020-07-08 PROCEDURE — H2035 A/D TX PROGRAM, PER HOUR: HCPCS | Mod: HQ,GT

## 2020-07-08 NOTE — GROUP NOTE
Group Therapy Documentation    PATIENT'S NAME: Samuel Cowan  MRN:   [id number]  :   1995  ACCT. NUMBER: [id number]  DATE OF SERVICE: 20  START TIME:  5:30 PM  END TIME:  8:30 PM  FACILITATOR(S): Gabriela Garcia LICSW  TOPIC: BEH Group Therapy  Number of patients attending the group:  6  Group Length:  3 Hours    Group Therapy Type: Addiction    Summary of Group / Topics Discussed:    Psychoeducation/Skills Self-Care and Humor in Recovery (IOP)  Learn how powerful self-care is in healing from addiction and building resiliency in mental and physical health.  Learn many aspects of self-care linked to elevating moods, increasing energy reserve and staminal, thinking clearer, focusing better on tasks and improving organizational skills.     Objective(s):    Name 3 reasons self-care is crucial for optimal health and recovery from illness.      Identify 2 mental and physical illnesses directly linked to poor self-care.    Identify 3 ways social connections build resilience and may strengthen our immune systems.    Create a personal plan of action to add to daily schedule of structure and routine.     Structure (modalities, homework, worksheets, etc.):    Self-Care quiz (worksheet)    Education on Self-Care with emphasis on Recovery (PowerPoint)    Why Self-care is a necessity, not a luxury (discussion/white board)    Worksheet to explore and prioritize needs    Action plan to start this week    Expected therapeutic outcome(s):     Be more proactive in their mental and physical health     Start their personal self-care plan this week and note results in journal    Take an inventory of choices and behaviors that hinder their health and have a negative effect on the quality of their lives.      Work to remove the obstacles that keep them from these aspects of self-care.     Therapeutic outcome(s) measured by:   Teachback, creation of personal action plan, and group review and evaluation  Telemedicine Visit:  "The patient's condition can be safely assessed and treated via synchronous audio and visual telemedicine encounter.      Reason for Telemedicine Visit: Services only offered telehealth     Originating Site (Patient Location): Patient's home     Distant Site (Provider Location): Provider Remote Setting     Consent:  The patient/guardian has verbally consented to: the potential risks and benefits of telemedicine (video visit) versus in person care; bill my insurance or make self-payment for services provided; and responsibility for payment of non-covered services.      Mode of Communication:  Video Conference via [employer]     As the provider I attest to compliance with applicable laws and regulations related to telemedicine.       Group Attendance:  Attended group session Samuel dropped off the video a couple times and had problems staying connected during the last hour of the group. She was charged for 2 hours instead of 3 hours.     Patient's response to the group topic/interactions:  discussed personal experience with topic and expressed readiness to alter behaviors    Patient appeared to be Attentive.        Client specific details:  Samuel participated with prompting. She got a new job to supplement her other job and is training for that this week and was tired today. She said she is taking things day by day and has been in a good mood. She does mindfulness \"all the time\" and her stress is at 6 due to having to adapt to a new schedule, her new job is from 5:00 AM to 2:30 PM and then she goes to another a couple days a week. Samuel found the information on the importance of good sleep hygiene interesting. She has had no cravings this week, just \"a lot of using dreams.\" She dropped off video before choosing what to work on this week. This topic completes one of the six required MH Skills Groups required in D3 of herTreatment Plan.     "

## 2020-07-09 ENCOUNTER — HOSPITAL ENCOUNTER (OUTPATIENT)
Dept: BEHAVIORAL HEALTH | Facility: CLINIC | Age: 25
End: 2020-07-09
Attending: FAMILY MEDICINE
Payer: COMMERCIAL

## 2020-07-10 NOTE — GROUP NOTE
"Group Therapy Documentation    PATIENT'S NAME: Samuel Cowan  MRN:   [id number]  :   1995  ACCT. NUMBER: [id number]  DATE OF SERVICE: 20  START TIME:  5:30 PM  END TIME:  7:30 PM  FACILITATOR(S): Tomy Moseley LADC  TOPIC: BEH Group Therapy  Number of patients attending the group:  12    Group Length:  2 Hours    Group Therapy Type: Addiction and Life skill(s)    Summary of Group / Topics Discussed:    Coping Skills/Lifestyle Managemet and Relapse awareness and prevention.  \"John to Self\" part 1. Science of safety applied to relapse.      Telemedicine Visit: The patient's condition can be safely assessed and treated via synchronous audio and visual telemedicine encounter.      Reason for Telemedicine Visit: Patient has requested telehealth visit    Originating Site (Patient Location): Patient's home    Distant Site (Provider Location): [facility]: [facility]    Consent:  The patient/guardian has verbally consented to: the potential risks and benefits of telemedicine (video visit) versus in person care; bill my insurance or make self-payment for services provided; and responsibility for payment of non-covered services.     Mode of Communication:  Video Conference via [employer]    As the provider I attest to compliance with applicable laws and regulations related to telemedicine.       Group Attendance:  Attended group session    Patient's response to the group topic/interactions:  confronted peers appropriately and did not discuss personal experience    Patient appeared to be Attentive.        Client specific details:  Client asked question about concept of euphoric recall and why mood altering chemicals are powerful for some and not others in terms of memory. Dimension 2, 3.  "

## 2020-07-10 NOTE — GROUP NOTE
"Group Therapy Documentation    PATIENT'S NAME: Samuel Cowan  MRN:   [id number]  :   1995  ACCT. NUMBER: [id number]  DATE OF SERVICE: 20  START TIME:  5:30 PM  END TIME:  7:30 PM  FACILITATOR(S): Tomy Moseley LADC  TOPIC: BEH Group Therapy  Number of patients attending the group:  9    Group Length:  2 Hours    Group Therapy Type: Life skill(s) and Relapse prevention safety plan.      Summary of Group / Topics Discussed: \"John to Self\" 2nd half of video/discussion regarding approaching relapse as a safety challenge, requiring a checklist for self to use to address.     Telemedicine Visit: The patient's condition can be safely assessed and treated via synchronous audio and visual telemedicine encounter.      Reason for Telemedicine Visit: Patient has requested telehealth visit    Originating Site (Patient Location): Patient's home    Distant Site (Provider Location): [facility]: [facility]    Consent:  The patient/guardian has verbally consented to: the potential risks and benefits of telemedicine (video visit) versus in person care; bill my insurance or make self-payment for services provided; and responsibility for payment of non-covered services.     Mode of Communication:  Video Conference via [place]    As the provider I attest to compliance with applicable laws and regulations related to telemedicine.        Group Attendance:  Excused from group session    Patient's response to the group topic/interactions:  No attendance.    Patient appeared to be Non-participatory.        Client specific details:  Client sent email that she had visitation with her kids and that was a conflict with attending group.  "

## 2020-07-13 ENCOUNTER — HOSPITAL ENCOUNTER (OUTPATIENT)
Dept: BEHAVIORAL HEALTH | Facility: CLINIC | Age: 25
End: 2020-07-13
Attending: FAMILY MEDICINE
Payer: COMMERCIAL

## 2020-07-13 PROCEDURE — H2035 A/D TX PROGRAM, PER HOUR: HCPCS | Mod: HQ,95

## 2020-07-14 NOTE — GROUP NOTE
Group Therapy Documentation    PATIENT'S NAME: Samuel Cowan  MRN:   [id number]  :   1995  ACCT. NUMBER: [id number]  DATE OF SERVICE: 20  START TIME:  5:30 PM  END TIME:  7:30 PM  FACILITATOR(S): Arnold Vasquez LADC  TOPIC: BEH Group Therapy  Number of patients attending the group:  9  Group Length:  2 Hours    Group Therapy Type: Addiction    Summary of Group / Topics Discussed:    Coping Skills/Lifestyle Managemet      Group Attendance:  Attended group session    Patient's response to the group topic/interactions:  cooperative with task    Patient appeared to be Engaged.        Client specific details: Client shared that  she practiced a coping skill of journaling   to manage using thoughts. This relates to Dim 1 goal of developing strategies to maintain sobriety.        Telemedicine Visit: The patient's condition can be safely assessed and treated via synchronous audio and visual telemedicine encounter.      Reason for Telemedicine Visit: Services only offered telehealth    Originating Site (Patient Location): Patient's home    Distant Site (Provider Location): Provider Remote Setting    Consent:  The patient/guardian has verbally consented to: the potential risks and benefits of telemedicine (video visit) versus in person care; bill my insurance or make self-payment for services provided; and responsibility for payment of non-covered services.     Mode of Communication:  Video Conference via [employer]    As the provider I attest to compliance with applicable laws and regulations related to telemedicine.

## 2020-07-16 ENCOUNTER — HOSPITAL ENCOUNTER (OUTPATIENT)
Dept: BEHAVIORAL HEALTH | Facility: CLINIC | Age: 25
End: 2020-07-16
Attending: FAMILY MEDICINE
Payer: COMMERCIAL

## 2020-07-16 PROCEDURE — H2035 A/D TX PROGRAM, PER HOUR: HCPCS | Mod: HQ,GT

## 2020-07-16 NOTE — GROUP NOTE
Group Therapy Documentation    PATIENT'S NAME: Samuel Cowan  MRN:   [id number]  :   1995  ACCT. NUMBER: [id number]  DATE OF SERVICE: 20  START TIME:  5:30 PM  END TIME:  7:30 PM  FACILITATOR(S): Arnold Vasquez LADC  TOPIC: BEH Group Therapy  Number of patients attending the group: 9  Group Length:  2 Hours    Group Therapy Type: Addiction    Summary of Group / Topics Discussed:    Happiness 1      Group Attendance:  Attended group session    Patient's response to the group topic/interactions:  cooperative with task    Patient appeared to be Attentive.        Client specific details:  Client watched Happiness video and Identified Acceptance of negative emotions   as something they could do to support their recovery and specifically to manage negative thought. This relates to DIM 3 t goal of Learn how to apply self-care and psychotherapy to build a repertoire of daily habits that increasedresiliency and well-being.    Telemedicine Visit: The patient's condition can be safely assessed and treated via synchronous audio and visual telemedicine encounter.      Reason for Telemedicine Visit: Services only offered telehealth    Originating Site (Patient Location): Patient's home    Distant Site (Provider Location): Provider Remote Setting    Consent:  The patient/guardian has verbally consented to: the potential risks and benefits of telemedicine (video visit) versus in person care; bill my insurance or make self-payment for services provided; and responsibility for payment of non-covered services.     Mode of Communication:  Video Conference via [employer]    As the provider I attest to compliance with applicable laws and regulations related to telemedicine.

## 2020-07-20 ENCOUNTER — HOSPITAL ENCOUNTER (OUTPATIENT)
Dept: BEHAVIORAL HEALTH | Facility: CLINIC | Age: 25
End: 2020-07-20
Attending: FAMILY MEDICINE
Payer: COMMERCIAL

## 2020-07-20 NOTE — GROUP NOTE
Group Therapy Documentation    PATIENT'S NAME: Samuel Cowan  MRN:   [id number]  :   1995  ACCT. NUMBER: [id number]  DATE OF SERVICE: 20  START TIME:  5:30 PM  END TIME:  7:30 PM  FACILITATOR(S): Arnold Vasquez LADC  TOPIC: BEH Group Therapy  Number of patients attending the group:  10  Group Length:  0    Group Therapy Type: Psychoeducation    Summary of Group / Topics Discussed:    No group was held tonight due to technical failure of the tele video platform      Group Attendance:  Attended group session

## 2020-07-21 ENCOUNTER — HOSPITAL ENCOUNTER (OUTPATIENT)
Dept: BEHAVIORAL HEALTH | Facility: CLINIC | Age: 25
End: 2020-07-21
Attending: FAMILY MEDICINE
Payer: COMMERCIAL

## 2020-07-21 PROCEDURE — H2035 A/D TX PROGRAM, PER HOUR: HCPCS | Mod: HQ,GT | Performed by: SOCIAL WORKER

## 2020-07-21 NOTE — PROGRESS NOTES
Samuel TED Cowan  [id number]    Treatment Plan Review     Total # of Phase 1 Group Sessions: 24 through 7/8     Total # of Phase 2 Group Sessions: 2 thru 7/20  Total # of Phase 3 Group Sessions:   Total # of 1:1 Sessions: 0    Support group attended this week: no    Reporting sobriety: Yes    Treatment Plan Review completed on:  7/21/2020     Projected discharge date: 9/17/20    Client preferred learning style: by reading, by hands-on practice and by watching someone else demonstrate    Staff member(s) contributing: Arnold Vasquez [facility]     Received supervision: No.    Client involvement with treatment planning: contributed to goals and plan.    Client received copy of plan/revised plan: Yes    Client agrees with plan/revised plan: Yes    Changes to Treatment Plan: No    New Goals added since last review: None.    Goal(s) worked on since last review: D4, D5 and D6. Clients in group discussion, self-assessed themselves on their level of motivation, readiness to change, risks to their sobriety and risks to staying motivated and discussed ways/strategies they could maintain their motivation for long term recovery. Clients agreed to practice specific self-care techniques and reported results to group. Client participated in presentation, group discussion and practice of Mindfulness techniques as coping skill for stress and anxiety.    Strategies effective: Yes    Treatment Coordination Activities: None.    Medical, Mental Health and other appointments the client attended: None.    Medication issues: None.    Physical and mental health problems: Yes - Client has thoughts of suicide or harm to others without means; however, the thoughts may interfere with participation in some treatment activities. Client has difficulty functioning in significant life areas. Client has moderate symptoms of emotional, behavioral, or cognitive problems. Client reports no SI/SH in last 30 days/    Review and evaluation of the individual  abuse prevention plan: The program's individual abuse prevention plan (IAPP) is sufficient for this client.     Substance Use Disorders:    Alcohol Use Disorder Severe - 303.90 (F10.20)  Cannabis Use Disorder Moderate - 304.30 (F12.20)    ASAM Risk Rating: (Note the rationale for risk rating changes)  Dimension 1 0 Client displays full functioning. No signs or symptoms of intoxication or withdrawal. Client currently rates desire to use substances at 2/10.     Dimension 2 1 The patient reported being in good overall physical health and she denied having any history of chronic medical problems.  The patient denied taking any prescription or OTC medications at this time.        Dimension 3 2 Client has difficulty with impulse control and lacks coping skills. Client has thoughts of suicide or harm to others without means; however, the thoughts may interfere with participation in some treatment activities. Client has difficulty functioning in significant life areas. Client has moderate symptoms of emotional, behavioral, or cognitive problems. Client is able to participate in most treatment activities.      Dimension 4 1 Client is motivated with active reinforcement, to explore treatment and strategies for change, but ambivalent about illness or need for change.Client needs to start exporing sober support more consistently.       Dimension 5 3 Client reported return to use episode over Memorial weekend. Client has poor recognition and understanding of relapse and recidivism issues and displays moderately high vulnerability for further substance use or mental health problems. Client has few coping skills and rarely applies coping skills.Client reports few sober friends.       Dimension 6 2 Patient lives alone in her apartment. Patient is employed, working 25-30 hours per week.  Patient reports she is in a romantic relationship, not concerned about his substance use. Patient reports having 2 children minor children who live  with her mother. Patient denies any past or current legal issues.      Client will participate virtual sober meetings, make calls to group peers for support and practice self-care.    BLESSING Subramanian

## 2020-07-22 NOTE — GROUP NOTE
Group Therapy Documentation    PATIENT'S NAME: Samuel Cowan  MRN:   [id number]  :   1995  ACCT. NUMBER: [id number]  DATE OF SERVICE: 20  START TIME:  5:30 PM  END TIME:  8:30 PM  FACILITATOR(S): Gabriela Garcia LICSW  TOPIC: BEH Group Therapy  Number of patients attending the group:  6  Group Length:  3 Hours    Group Therapy Type: Addiction    Summary of Group / Topics Discussed:    Psychoeducation/Skills How Neurobiology Affects Behavior (IOP)  This topic will give a general overview of the neurobiology of addiction with the purpose of teaching new brain-based coping strategies to reduce the impact of their cravings, urges and distorted memories.  The clients will learn how the midbrain uses memory and associations to create cravings and how to counteract these.     Objective(s):    Clients will identify 3 ways to calm their overactive midbrain and strengthen their frontal cortex to lessen the impact of cravings and urges for addictive substances.      Clients will identify the underlying mechanisms at work to help them detach from the thoughts and emotions that trigger using.     Describe the reward pathway involved in addiction    Identify 1 skill  to counteract cravings and urges to use substances    Structure (modalities, homework, worksheets, etc.):    History of the study of addiction as a disease (PowerPoint)    Psychoeducation on the areas of the midbrain involved in addiction and how the various parts of the brain communicate with each other (PowerPoint)    Psychoeducation and discussion on how the escalation of addiction manifests in personal behaviors leading to negative consequences     Hands on practice of evidenced based strategies to calm the midbrain     Hands on practice of evidenced based strategies to strengthen the frontal cortex    Psychoeducation of anatomy of cravings, urges and addictive thinking and how to counteract this pattern     Show video clip of cross-addictions &  process addictions    Expected therapeutic outcome(s):     A reduction in shame and guilt due to understanding how addiction influences behavior.    Reduction of use episodes due to using skills to reduce cravings.    Therapeutic outcome(s) measured by:   Teachback and group review and evaluation form    Telemedicine Visit: The patient's condition can be safely assessed and treated via synchronous audio and visual telemedicine encounter.      Reason for Telemedicine Visit: Services only offered telehealth     Originating Site (Patient Location): Patient's home     Distant Site (Provider Location): Provider Remote Setting     Consent:  The patient/guardian has verbally consented to: the potential risks and benefits of telemedicine (video visit) versus in person care; bill my insurance or make self-payment for services provided; and responsibility for payment of non-covered services.      Mode of Communication:  Video Conference via [employer]     As the provider I attest to compliance with applicable laws and regulations related to telemedicine.           Group Attendance:  Attended group session Samuel appeared to have a cold in the early part of the group, then seemed to look feverish but she still wanted to stay in the video group. As we progressed I noticed she appeared worse so I  encouraged her to go to Urgent Care.  I'll follow up on completing the topic with her.  She was in group session from 5:30 PM - 6:45 PM for 105 min, billed a hour instead of 3 hours.   Patient's response to the group topic/interactions:  did not share thoughts verbally    Patient appeared to be unwell, participated the first hour.         Client specific details:  Samuel appeared to have a bad cold but this appeared to get worse (see above) so I encouraged her to go to Urgent Care as she was feverish. She was able to stay for an hour and 15 minutes and did participate for the first hour. She will meet with me to complete this  session. She covered half of this topic which is one of the six required MH Skills Groups required in D3 of her Treatment Plan.

## 2020-07-27 ENCOUNTER — HOSPITAL ENCOUNTER (OUTPATIENT)
Dept: BEHAVIORAL HEALTH | Facility: CLINIC | Age: 25
End: 2020-07-27
Attending: FAMILY MEDICINE
Payer: COMMERCIAL

## 2020-07-27 PROCEDURE — H2035 A/D TX PROGRAM, PER HOUR: HCPCS | Mod: HQ,95

## 2020-07-27 NOTE — GROUP NOTE
Group Therapy Documentation    PATIENT'S NAME: Samuel Cowan  MRN:   [id number]  :   1995  ACCT. NUMBER: [id number]  DATE OF SERVICE: 20  START TIME:  5:30 PM  END TIME:  7:30 PM  FACILITATOR(S): Anrold Vasquez LADC  TOPIC: BEH Group Therapy  Number of patients attending the group:  4  Group Length:  2 Hours    Group Therapy Type: Addiction    Summary of Group / Topics Discussed:    Locus of Control      Group Attendance:  Attended group session    Patient's response to the group topic/interactions:  cooperative with task and discussed personal experience with topic    Patient appeared to be Attentive and Engaged.        Client specific details:  Client self-assessed in group their Locus of Control and shared how it has shifted from Internal, while in active addiction to External in Recovery. Client shared how her recent illness  Affected here inability to be there for her kids. This relates to clients Dim 3 treatment plan goal of Learn how to apply self-care and psychotherapy to build a repertoire of daily habits that counteract addictive thinking (depression and anxiety, guilt and shame) leading to increased resiliency and well-being.    Telemedicine Visit: The patient's condition can be safely assessed and treated via synchronous audio and visual telemedicine encounter.      Reason for Telemedicine Visit: Services only offered telehealth    Originating Site (Patient Location): Patient's home    Distant Site (Provider Location): Provider Remote Setting    Consent:  The patient/guardian has verbally consented to: the potential risks and benefits of telemedicine (video visit) versus in person care; bill my insurance or make self-payment for services provided; and responsibility for payment of non-covered services.     Mode of Communication:  Video Conference via [email or web address]    As the provider I attest to compliance with applicable laws and regulations related to telemedicine.

## 2020-08-03 ENCOUNTER — HOSPITAL ENCOUNTER (OUTPATIENT)
Dept: BEHAVIORAL HEALTH | Facility: CLINIC | Age: 25
End: 2020-08-03
Attending: FAMILY MEDICINE
Payer: COMMERCIAL

## 2020-08-03 PROCEDURE — H2035 A/D TX PROGRAM, PER HOUR: HCPCS | Mod: HQ,95

## 2020-08-03 NOTE — GROUP NOTE
Group Therapy Documentation    PATIENT'S NAME: Samuel Cowan  MRN:   [id number]  :   1995  ACCT. NUMBER: [id number]  DATE OF SERVICE: 20  START TIME:  5:30 PM  END TIME:  7:30 PM  FACILITATOR(S): Arnold Vasquez LADC  TOPIC: BEH Group Therapy  Number of patients attending the group:  8  Group Length:  2 Hours    Group Therapy Type: Addiction    Summary of Group / Topics Discussed:    Choices in Recovery Pleasure [employer]woven      Group Attendance:  Attended group session    Patient's response to the group topic/interactions:  did not discuss personal experience and expressed understanding of topic    Patient appeared to be Attentive and Engaged.        Client specific details:  Client watched a portion of Pleasure [employer] video and Identified with addiction being a disease of  the ability to choose . Client shared phone numbers and agreed to call each other prior to next group.   as something they could do to support their recovery and specifically to manage negative thought. This relates to DIM 3  goal of Learn how to apply self-care and psychotherapy to build a repertoire of daily habits that increased resiliency and well-being.    Telemedicine Visit: The patient's condition can be safely assessed and treated via synchronous audio and visual telemedicine encounter.      Reason for Telemedicine Visit: Services only offered telehealth    Originating Site (Patient Location): Patient's home    Distant Site (Provider Location): Provider Remote Setting    Consent:  The patient/guardian has verbally consented to: the potential risks and benefits of telemedicine (video visit) versus in person care; bill my insurance or make self-payment for services provided; and responsibility for payment of non-covered services.     Mode of Communication:  Video Conference via [employer]    As the provider I attest to compliance with applicable laws and regulations related to telemedicine.

## 2020-08-05 ENCOUNTER — HOSPITAL ENCOUNTER (OUTPATIENT)
Dept: BEHAVIORAL HEALTH | Facility: CLINIC | Age: 25
End: 2020-08-05
Attending: FAMILY MEDICINE
Payer: COMMERCIAL

## 2020-08-05 PROCEDURE — H2035 A/D TX PROGRAM, PER HOUR: HCPCS | Mod: HQ,GT

## 2020-08-05 NOTE — GROUP NOTE
Group Therapy Documentation    PATIENT'S NAME: Samuel Cowan  MRN:   [id number]  :   1995  ACCT. NUMBER: [id number]  DATE OF SERVICE: 20  START TIME:  5:30 PM  END TIME:  7:30 PM  FACILITATOR(S): Arnold Vasquez LADC  TOPIC: BEH Group Therapy  Number of patients attending the group: 7  Group Length:  2 Hours    Group Therapy Type: Addiction    Summary of Group / Topics Discussed:    Psychoeducation/Skills Relapse Prevention (ANAYA)  This topic will give a general overview of basic relapse prevention, including definitions of warning signs, triggers and cravings and the importance of addressing healthy coping skills for ongoing relapse prevention.    Objective(s):    Patients will identify 4 key warning signs and triggers    Patients will identify 4 healthy coping mechanisms         Structure (modalities, homework, worksheets, etc.):    Provide psychoeducation on relapse prevention and healthy coping methods.    Facilitate group discussion around each patient s current awareness of warning signs,  triggers and cravings.     Expected therapeutic outcome(s):    Patient will:    Be able to manage triggers and cravings without returning to substance use.      Therapeutic outcomes measured by:    Patients will name 4 of their warning signs and triggers    Patients will name 4 healthy coping mechanisms for dealing with their warning signs and triggers      Group Attendance:  Attended group session    Patient's response to the group topic/interactions:  confronted peers appropriately    Patient appeared to be Attentive and Engaged.        Client specific details:  Client  discussion and self-evaluation of external pressures to use, thoughts, attitudes and behaviors that play a role in returning to use episodes. Client reviewed in group Jaime s Warning Signs and Phases of a relapse. During group process time clients further explored their warning signs and experience with relapse and what coping skills/tools  they find most effective. This relates to DIM 5 goal to Identify personal triggers and relapse warning signs.     Telemedicine Visit: The patient's condition can be safely assessed and treated via synchronous audio and visual telemedicine encounter.      Reason for Telemedicine Visit: Services only offered telehealth    Originating Site (Patient Location): Patient's home    Distant Site (Provider Location): Provider Remote Setting    Consent:  The patient/guardian has verbally consented to: the potential risks and benefits of telemedicine (video visit) versus in person care; bill my insurance or make self-payment for services provided; and responsibility for payment of non-covered services.     Mode of Communication:  Video Conference via [employer]    As the provider I attest to compliance with applicable laws and regulations related to telemedicine.

## 2020-08-10 ENCOUNTER — HOSPITAL ENCOUNTER (OUTPATIENT)
Dept: BEHAVIORAL HEALTH | Facility: CLINIC | Age: 25
End: 2020-08-10
Attending: FAMILY MEDICINE
Payer: COMMERCIAL

## 2020-08-10 PROCEDURE — H2035 A/D TX PROGRAM, PER HOUR: HCPCS | Mod: HQ,GT

## 2020-08-10 NOTE — GROUP NOTE
Group Therapy Documentation    PATIENT'S NAME: Samuel Cowan  MRN:   [id number]  :   1995  ACCT. NUMBER: [id number]  DATE OF SERVICE: 8/10/20  START TIME:  5:30 PM  END TIME:  7:30 PM  FACILITATOR(S): Arnold Vasquez LADC  TOPIC: BEH Group Therapy  Number of patients attending the group:  9  Group Length:  2 Hours    Group Therapy Type: Addiction    Summary of Group / Topics Discussed:    Anxiety      Group Attendance:  Attended group session    Patient's response to the group topic/interactions:  cooperative with task    Patient appeared to be Actively participating.        Client specific details:  Client shared in group and self-assessed their level of anxiety and shared in group how it manifest in their lives. This relates to clients Dim 3 treatment plan goal of Learn how to apply self-care and psychotherapy to build a repertoire of daily habits that counteract PAWS, fatigue, depression and anxiety leading to increased resiliency and well-being and are things the client can work to improve..    Telemedicine Visit: The patient's condition can be safely assessed and treated via synchronous audio and visual telemedicine encounter.      Reason for Telemedicine Visit: Services only offered telehealth    Originating Site (Patient Location): Patient's home    Distant Site (Provider Location): Provider Remote Setting    Consent:  The patient/guardian has verbally consented to: the potential risks and benefits of telemedicine (video visit) versus in person care; bill my insurance or make self-payment for services provided; and responsibility for payment of non-covered services.     Mode of Communication:  Video Conference via [employer]    As the provider I attest to compliance with applicable laws and regulations related to telemedicine.

## 2020-08-10 NOTE — PROGRESS NOTES
Samuel Cowan  [id number]    Treatment Plan Review     Total # of Phase 1 Group Sessions: 24      Total # of Phase 2 Group Sessions: 6 thru 8/6  Total # of Phase 3 Group Sessions:   Total # of 1:1 Sessions: 0    Support group attended this week: no    Reporting sobriety: Yes    Treatment Plan Review completed on:  8/10/2020     Projected discharge date: 10/13/20    Client preferred learning style: by reading, by hands-on practice and by watching someone else demonstrate    Staff member(s) contributing: AMENA Subramanian     Received supervision: No.    Client involvement with treatment planning: contributed to goals and plan.    Client received copy of plan/revised plan: Yes    Client agrees with plan/revised plan: Yes    Changes to Treatment Plan: No    New Goals added since last review: None.    Goal(s) worked on since last review: Dim 3 treatment plan goal of Learn how to apply self-care and psychotherapy to build a repertoire of daily habits that counteract PAWS, fatigue, depression and anxiety leading to increased resiliency and well-being, Dim 4- understand the impact your substance use has had on his significant relationships, Dim 5 treatment plan goal of Develop sober coping and living skills in order to address the development of a strategy for long term recovery     Strategies effective: Yes    Treatment Coordination Activities: None.    Medical, Mental Health and other appointments the client attended: None.    Medication issues: None.    Physical and mental health problems: Yes - Client has thoughts of suicide or harm to others without means; however, the thoughts may interfere with participation in some treatment activities. Client has difficulty functioning in significant life areas. Client has moderate symptoms of emotional, behavioral, or cognitive problems. Client reports no SI/SH in last 30 days/    Review and evaluation of the individual abuse prevention plan: The program's individual abuse  prevention plan (IAPP) is sufficient for this client.     Substance Use Disorders:    Alcohol Use Disorder Severe - 303.90 (F10.20)  Cannabis Use Disorder Moderate - 304.30 (F12.20)    ASAM Risk Rating: (Note the rationale for risk rating changes)  Dimension 1 0 Client displays full functioning. No signs or symptoms of intoxication or withdrawal. Client currently rates desire to use substances at 2/10.     Dimension 2 1 The patient reported being in good overall physical health and she denied having any history of chronic medical problems.  The patient denied taking any prescription or OTC medications at this time.        Dimension 3 2 Client has difficulty with impulse control and lacks coping skills. Client has thoughts of suicide or harm to others without means; however, the thoughts may interfere with participation in some treatment activities. Client has difficulty functioning in significant life areas. Client has moderate symptoms of emotional, behavioral, or cognitive problems. Client is able to participate in most treatment activities.      Dimension 4 1 Client is motivated with active reinforcement, to explore treatment and strategies for change, but ambivalent about illness or need for change.Client needs to start exporing sober support more consistently.       Dimension 5 3 Client has poor recognition and understanding of relapse and recidivism issues and displays moderately high vulnerability for further substance use or mental health problems. Client has few coping skills and rarely applies coping skills.Client reports few sober friends and acknowledges need to find sober friends but given limited open meetings, is having difficulty attending.       Dimension 6 2 Patient lives alone in her apartment. Patient is employed, working 25-30 hours per week.  Patient reports she is in a romantic relationship, not concerned about his substance use. Patient reports having 2 children minor children who live with  her mother. Patient denies any past or current legal issues.      Client will participate virtual sober meetings, make calls to group peers for support and practice self-care.    BLESSING Subramanian

## 2020-08-11 ENCOUNTER — HOSPITAL ENCOUNTER (OUTPATIENT)
Dept: BEHAVIORAL HEALTH | Facility: CLINIC | Age: 25
End: 2020-08-11
Attending: FAMILY MEDICINE
Payer: COMMERCIAL

## 2020-08-11 PROCEDURE — H2035 A/D TX PROGRAM, PER HOUR: HCPCS | Mod: GT

## 2020-08-11 NOTE — PROGRESS NOTES
INDIVIDUAL THERAPY NOTE  TIME: 430pm  Duration: 50 minutes    Data: Reviewed treatment plan goal progress with client. Discussed clients plan to develop sober coping and living skills in order to address the development of a strategy for long term recovery.      Intervention: Using Person Centered approach, supported clients efforts to build sober support network.      Assessment: Client has prior tx experience and knows her success is directly related to the actions she takes outside group that are congruent with her goals.      Plan: Client will provide counselor every other week  with progress updates on her progress .        Telemedicine Visit: The patient's condition can be safely assessed and treated via synchronous audio and visual telemedicine encounter.      Reason for Telemedicine Visit: Services only offered telehealth    Originating Site (Patient Location): Patient's home    Distant Site (Provider Location): Provider Remote Setting    Consent:  The patient/guardian has verbally consented to: the potential risks and benefits of telemedicine (video visit) versus in person care; bill my insurance or make self-payment for services provided; and responsibility for payment of non-covered services.     Mode of Communication:  Video Conference via [employer]    As the provider I attest to compliance with applicable laws and regulations related to telemedicine.    AMENA Subramanian

## 2020-08-11 NOTE — TREATMENT PLAN
[facility]  Adult Chemical Dependency Program  Treatment Plan Requirements    These services are provided by the facility for each patient/client according to the individual's treatment plan:    Individual and group counseling    Education    Transition services    Services to address any co-occurring mental illness    Service coordination    Initial Treatment Plan Goals:  1. Complete all the requirements of Program Orientation.  2. Maintain medication compliance throughout the program.  3. Complete requirements for workshop/skills groups based on identified issues on your problem list.  4. Complete the support group attendance feedback sheet weekly.  5. Gain family involvement in treatment process to address family issues from the problem list.  6. Attend and participate in all required groups per individual treatment plan.  7. Focus attention to individualized issues from the treatment plan.  8. Complete all requirements for UA's, alcohol screening tests and other testing.  9. Schedule a physical examination if recommended.    In addition to the above, complete all individual goals as specifically outlines on your treatment plan.    Criteria for discharge:  Patients/clients are discharged from the program following completion of the entire program including Phase I and II or acceptance of other post-treatment referrals such as [facility] house, or aftercare at other facilities.  Patients/clients may also be discharged for inappropriate behavior or chemical use.      Favorable Discharge - Patients/clients have completed agreed upon treatment goals, understand their diagnosis and appear motivated about the follow-up care.    Guarded Discharge - Patients/clients have demonstrated some understanding of their diagnosis and recovery process, and have completed some of their treatment goals.  This prognosis also includes patients/clients who have completed some treatment goals but have not made  commitment to community support or follow through with referrals.    Unfavorable Discharge - Patients/clients have not completed agreed upon treatment goals due to their own choice, have limited understanding of their diagnosis, and have shown minimal or inconsistent behavior conducive to recovery.  Those patients/clients discharged due to behavioral problems will also be unfavorable discharges.                                  Adult CD Treatment Plan     Samuel Cowan [id number]   5/6/2020 24 year old female        ------------------------------------------------------------------------------------------------------------------  Acute Intoxication/Withdrawal Potential     DIMENSION 1  RISK FACTOR: 0      SUBSTANCE USE DISORDERS:  Alcohol Use Disorder Severe - 303.90 (F10.20)  Cannabis Use Disorder Moderate - 304.30 (F12.20)             Assignment Date Source Prob/Goal/  Intervention Target  Date Initials Outcome Completion  Date   5/6/2020 Self -  Current History -  Current Assessment -  Current  Area of Treatment Focus: Substance use, cravings and urges.  Last use date was reported as 04/23/2020.       Goal:   Develop effective strategies to maintain sobriety.      Treatment Strategies:  (Note: Must indicate the amount and frequency for each strategy)    Report to counselor and group any alcohol or drug use.                            JK {Adult CD Outcome:[id number]} ***         Biomedical Conditions and Complaints       DIMENSION 2  RISK FACTOR: 1               Assignment Date Source Prob/Goal/  Intervention Target  Date Initials Outcome Completion  Date   5/6/2020 Self -  Current History -  Current Assessment -  Current  Area of Treatment Focus: None    Goal:   Obtain a medical evaluation.      Disclose CD status to medical providers and follow up with medical interventions while in IOP.   Maintain good physical health.    Treatment Strategies:  (Note: Must indicate the amount and frequency for each  strategy)    Report change in severity of symptoms to staff.     Continue to take prescribed medications and follow-up with medical interventions while in program.    Exercise at least 30 minutes/3 times per week.                                             Ongoing through  9/17/20    Ongoing through  9/17/20          Ongoing through  9/17/20     QUINN  ***       -----------------------------------------------------------------------------------------------------------------    Emotional/Behavioral/Cognitive Conditions and Complications       DIMENSION 3  RISK FACTOR: 2               Assignment Date Source Prob/Goal/  Intervention Target  Date Outcome Completion  Date   5/6/2020 Self -  Current History -  Current Assessment -  Current   Area of Treatment Focus:  Client lacks understanding of addiction as a disease and how this disorder affects other aspects of mental and physical health.     Goal:   Learn how to apply self-care and psychotherapy to build a repertoire of daily habits that counteract PAWS, fatigue, depression and anxiety leading to increased resiliency and well-being.      Treatment Strategies:  Attend each of the following groups one time a week and complete all 6 by due date, unless excused by primary counselor.  All group work to be completed each session to receive an effective outcome.      > Neurobiology of addiction: Informs client of brain changes and reduces feelings of shame, instructs on steps to help heal     > Learn and use Mindfulness to facilitate effective action in problem solving and promote health and well-being      > Mental Health Part 1: Learn 3 core processes of Acceptance,   Cognitive De-fusion, and Being Present     > Mental Health Part 2: Learn 3 core processes of Self-as-Context, Values, and Committed Action      > Learn and apply Self-Care in a variety of areas to improve mental and physical health, reduce PAWS, and increase feelings of self-empowerment, resiliency and  well-being     > Learn Stress Management techniques to reduce PAWS and stress-related symptoms, increase resiliency, and learn healthy routines to replace dysfunctional habits      Areas of Treatment:   Assessment by both counselor and therapist to determine if a  Diagnostic Assessment is recommended for this client in order to enhance client outcomes in this treatment program.                                                                             [Absent]  Effective             Effective               Effective               Effective               [Excused]  Effective                   Effective                       Did not want a DA                                                                        [06/09/20]  07/21/20 05/05/20 05/12/20 05/19/20 6/23/2020            [05/26/20]  07/07/20 06/02/20                       Has [facility] care in place     -------------------------------------------------------------------------------------------------------------------     Readiness to Change       DIMENSION 4  RISK FACTOR: 1               Assignment Date Source Prob/Goal/  Intervention Target  Date Initials Outcome Completion  Date   5/6/2020 Self -  Current History -  Current Assessment -  Current  Area of Treatment Focus: {Client/Patient lacks internal motivation to stop using substances.      Goal:      Understand the impact your substance use has had on you, your family, and significant relationships.    Increase internal motivation to change.    Treatment Strategies:  (Note: Must indicate the amount and frequency for each strategy)     Present 10 [place]st [place] assignment.  Once.  First half:    Second half:       Each week write down 3 reasons you want to remain sober.     Complete Symptoms assignment. Once.                                                       By: 5/21/20 5/28/20    Ongoing through  9/17/20 6/18/20    "QUINN BALL    JHEBERT                                                         Effective    Effective                                                                       5/13/20 5/14/20       -------------------------------------------------------------------------------------------------------------------     Relapse/Continues Use/Continues Problem Potential       DIMENSION 5  RISK FACTOR: 3                   Assignment Date Source Prob/Goal/  Intervention Target  Date Initials Outcome Completion  Date   5/6/2020 Self -  Current History -  Current Assessment -  Current  Area of Treatment Focus: Lacks a daily routine that includes healthy and sober living skills.    and Client/Patient does not recognize relapse triggers and warning signs.       Goal:     Identify personal triggers and relapse warning signs.      Develop sober coping and living skills in order to address the development of a strategy for long term recovery.    Treatment Strategies:  (Note: Must indicate the amount and frequency for each strategy)     Develop a relapse prevention plan including lifestyle changes, recovery activities, daily structure, self-care, support systems, spirituality, work, finances, recreation and crisis management. Once.                                                             By:  7/9/20 QUINN  ***     Recovery Environment       DIMENSION 6  RISK FACTOR: 2                   AssignmentDate Source Prob/Goal/  Intervention Target  Date Initials Outcome Completion  Date   5/6/2020 Self -  Current History -  Current Assessment -  Current  Area of Treatment Focus: Lacks sober support network.       Goal:   Increase sober support network. Identify and attend sober support group meetings.    Treatment Strategies:  (Note: Must indicate the amount and frequency for each strategy)     Complete the personal \"Recovery Care Assignment\" Once. "                                     By:  9/10/20   ISHAANK  ***     Individual abuse prevention plan (required for lodging plus) : specific actions, referral:   No additional protection measures required other than the Program Abuse Prevention Plan - The program's individual abuse prevention plan (IAPP) is sufficient for this client.      All interventions that are designated as current will need to be completed in order to transition out of treatment with a favorable prognosis. The treatment plan is a flexible document and a work in progress. Interventions and goals may be added at any time to customize this plan to each individual's needs. Client may work with clinician to change interventions as long as they pertain to the goals stipulated in the plan and/or are clinically driven.    AMENA Subramanian    Acknowledgement of Current Treatment Plan - Initial Treatment Plan     INITIAL TREATMENT PLAN:     1. I have participated in creating my treatment plan with my therapist / counselor on 5/6/2020     I agree with the plan as it is written in the electronic health record.    Name Signature/Date   Samuel Cowan    Name of Therapist / Counselor Signature/Date   AMENA Subramanian      2. I have completed and reviewed my Safety Plan with my counselor and signed this on 5/6/20. I have been given the hard copy of this plan.    Patient signature/date:      ___________________________________________________________________5/6/2020    3. Last Use Date: As Client reported: 04/23/2020    Patient signature/date:     ____________________________________________________________________5/6/2020

## 2020-08-12 ENCOUNTER — HOSPITAL ENCOUNTER (OUTPATIENT)
Dept: BEHAVIORAL HEALTH | Facility: CLINIC | Age: 25
End: 2020-08-12
Attending: FAMILY MEDICINE
Payer: COMMERCIAL

## 2020-08-12 PROCEDURE — H2035 A/D TX PROGRAM, PER HOUR: HCPCS | Mod: HQ,95

## 2020-08-13 ENCOUNTER — HOSPITAL ENCOUNTER (OUTPATIENT)
Dept: BEHAVIORAL HEALTH | Facility: CLINIC | Age: 25
End: 2020-08-13
Attending: FAMILY MEDICINE
Payer: COMMERCIAL

## 2020-08-13 PROCEDURE — H2035 A/D TX PROGRAM, PER HOUR: HCPCS | Mod: HQ,95

## 2020-08-13 NOTE — GROUP NOTE
Group Therapy Documentation    PATIENT'S NAME: Samuel Cowan  MRN:   [id number]  :   1995  ACCT. NUMBER: [id number]  DATE OF SERVICE: 20  START TIME:  5:30 PM  END TIME:  7:30 PM  FACILITATOR(S): Arnold Vasquez LADC  TOPIC: BEH Group Therapy  Number of patients attending the group: 9  Group Length:  2 Hours    Group Therapy Type: Psychoeducation    Summary of Group / Topics Discussed:    Anxiety and Acceptance      Group Attendance:  Attended group session    Patient's response to the group topic/interactions:  confronted peers appropriately and discussed personal experience with topic    Patient appeared to be Attentive and Engaged.        Client specific details:  Client identified acceptance as something she will try in an effort to reduce anxiety and become more happy and healthier.  Client stated that  acceptance to me means  Being at peace with the past.                  This relates to clients Dim 3 treatment plan goal of Learn how to apply self-care and psychotherapy to build a repertoire of daily habits that counteract addictive thinking (depression and anxiety, guilt and shame) leading to increased resiliency and well-being..    Telemedicine Visit: The patient's condition can be safely assessed and treated via synchronous audio and visual telemedicine encounter.      Reason for Telemedicine Visit: Services only offered telehealth    Originating Site (Patient Location): Patient's home    Distant Site (Provider Location): Provider Remote Setting    Consent:  The patient/guardian has verbally consented to: the potential risks and benefits of telemedicine (video visit) versus in person care; bill my insurance or make self-payment for services provided; and responsibility for payment of non-covered services.     Mode of Communication:  Video Conference via [employer]    As the provider I attest to compliance with applicable laws and regulations related to telemedicine.

## 2020-08-13 NOTE — GROUP NOTE
"Group Therapy Documentation    PATIENT'S NAME: Samuel Cowan  MRN:   [id number]  :   1995  ACCT. NUMBER: [id number]  DATE OF SERVICE: 20  START TIME:  5:30 PM  END TIME:  7:30 PM  FACILITATOR(S): Arnold Vasquez LADC  TOPIC: BEH Group Therapy  Number of patients attending the group:  9  Group Length:  2 Hours    Group Therapy Type: Psychoeducation    Summary of Group / Topics Discussed:    Anxiety Coping Skills      Group Attendance:  Attended group session    Patient's response to the group topic/interactions:  cooperative with task and discussed personal experience with topic    Patient appeared to be Attentive and Engaged.        Client specific details:  Client identified \"imagery grounding techniques\" as   a way to manage symptoms of anxiety. This relates to clients Dim 3 treatment plan goal of Learn how to apply self-care and psychotherapy to build a repertoire of daily habits that counteract PAWS, fatigue, depression and anxiety leading to increased resiliency and well-being..    Telemedicine Visit: The patient's condition can be safely assessed and treated via synchronous audio and visual telemedicine encounter.      Reason for Telemedicine Visit: Services only offered telehealth    Originating Site (Patient Location): Patient's home    Distant Site (Provider Location): Provider Remote Setting    Consent:  The patient/guardian has verbally consented to: the potential risks and benefits of telemedicine (video visit) versus in person care; bill my insurance or make self-payment for services provided; and responsibility for payment of non-covered services.     Mode of Communication:  Video Conference via [employer]    As the provider I attest to compliance with applicable laws and regulations related to telemedicine.    "

## 2020-08-17 ENCOUNTER — HOSPITAL ENCOUNTER (OUTPATIENT)
Dept: BEHAVIORAL HEALTH | Facility: CLINIC | Age: 25
End: 2020-08-17
Attending: FAMILY MEDICINE
Payer: COMMERCIAL

## 2020-08-17 PROCEDURE — H2035 A/D TX PROGRAM, PER HOUR: HCPCS | Mod: HQ,GT

## 2020-08-17 NOTE — GROUP NOTE
Group Therapy Documentation    PATIENT'S NAME: Samuel Cowan  MRN:   [id number]  :   1995  ACCT. NUMBER: [id number]  DATE OF SERVICE: 20  START TIME:  5:30 PM  END TIME:  7:30 PM  FACILITATOR(S): Arnold Vasquez LADC  TOPIC: BEH Group Therapy  Number of patients attending the group:  9  Group Length:  2 Hours    Group Therapy Type: Addiction    Summary of Group / Topics Discussed:    Choices in Recovery      Group Attendance:  Attended group session  Did not return after break for final 30 minutes.  Patient's response to the group topic/interactions:  left the group on several occasions    Patient appeared to be Inattentive.        Client specific details:   Client discussed and self-assessed in group their barriers to recovery and shared how it has thwarted previous thoughts and attempts to stop using. Client shared that her social life  was a major barrier in the past.  This relates to clients Dim 3 treatment plan goal of Learn how to apply self-care and psychotherapy to build a repertoire of daily habits that counteract addictive thinking (depression and anxiety, guilt and shame) leading to increased resiliency and well-being..    Telemedicine Visit: The patient's condition can be safely assessed and treated via synchronous audio and visual telemedicine encounter.      Reason for Telemedicine Visit: Services only offered telehealth    Originating Site (Patient Location): Patient's home    Distant Site (Provider Location): Provider Remote Setting    Consent:  The patient/guardian has verbally consented to: the potential risks and benefits of telemedicine (video visit) versus in person care; bill my insurance or make self-payment for services provided; and responsibility for payment of non-covered services.     Mode of Communication:  Video Conference via [employer]    As the provider I attest to compliance with applicable laws and regulations related to telemedicine.

## 2020-08-20 ENCOUNTER — HOSPITAL ENCOUNTER (OUTPATIENT)
Dept: BEHAVIORAL HEALTH | Facility: CLINIC | Age: 25
End: 2020-08-20
Attending: FAMILY MEDICINE
Payer: COMMERCIAL

## 2020-08-20 PROCEDURE — H2035 A/D TX PROGRAM, PER HOUR: HCPCS | Mod: HQ,GT

## 2020-08-20 NOTE — GROUP NOTE
Group Therapy Documentation    PATIENT'S NAME: Sameul Cowan  MRN:   [id number]  :   1995  ACCT. NUMBER: [id number]  DATE OF SERVICE: 20  START TIME:  5:30 PM  END TIME:  7:30 PM  FACILITATOR(S): Arnold Vasquez LADC  TOPIC: BEH Group Therapy  Number of patients attending the group:  10  Group Length:  2 Hours    Group Therapy Type: Psychoeducation    Summary of Group / Topics Discussed:    Cognitive Therapy Techniques      Group Attendance:  Attended group session    Patient's response to the group topic/interactions:  cooperative with task    Patient appeared to be Attentive and Engaged.        Client specific details:  Clients explored their personal ambivalence to making change. Clients acknowledged they have the ability to make lifestyle changes and good choices that will establish their recovery. Client discussed in group importance of how recovery requires taking action to effect change. Client share recovery action she will take over the weekend of getting together with other women in recovery and report back to group on Monday. This relates to clients Dim 4 goal to Address ambivalence regarding substance use and sobriety. .    Telemedicine Visit: The patient's condition can be safely assessed and treated via synchronous audio and visual telemedicine encounter.      Reason for Telemedicine Visit: Services only offered telehealth    Originating Site (Patient Location): Patient's home    Distant Site (Provider Location): Provider Remote Setting    Consent:  The patient/guardian has verbally consented to: the potential risks and benefits of telemedicine (video visit) versus in person care; bill my insurance or make self-payment for services provided; and responsibility for payment of non-covered services.     Mode of Communication:  Video Conference via [employer]    As the provider I attest to compliance with applicable laws and regulations related to telemedicine.

## 2020-08-24 ENCOUNTER — HOSPITAL ENCOUNTER (OUTPATIENT)
Dept: BEHAVIORAL HEALTH | Facility: CLINIC | Age: 25
End: 2020-08-24
Attending: FAMILY MEDICINE
Payer: COMMERCIAL

## 2020-08-24 PROCEDURE — H2035 A/D TX PROGRAM, PER HOUR: HCPCS | Mod: HQ,GT

## 2020-08-24 NOTE — GROUP NOTE
Group Therapy Documentation    PATIENT'S NAME: Samuel Cowan  MRN:   [id number]  :   1995  ACCT. NUMBER: [id number]  DATE OF SERVICE: 20  START TIME:  5:30 PM  END TIME:  7:30 PM  FACILITATOR(S): Arnold Vasquez LADC  TOPIC: BEH Group Therapy  Number of patients attending the group: 10  Group Length:  2 Hours    Group Therapy Type: Life skill(s)    Summary of Group / Topics Discussed:    Coping Skills/Lifestyle Managemet and Choices in Recovery      Group Attendance:  Attended group session    Patient's response to the group topic/interactions:  cooperative with task    Patient appeared to be Attentive and Engaged.        Client specific details: Clients reviewed and discussed in group the topic of self-esteem. Client identified ways to increase self-esteem by   Setting character goals.   Client watched Video  Everything you thought you knew about addition is wrong . Video helps explain the importance of developing sober connection for recovery support. .    Telemedicine Visit: The patient's condition can be safely assessed and treated via synchronous audio and visual telemedicine encounter.      Reason for Telemedicine Visit: Services only offered telehealth    Originating Site (Patient Location): Patient's home    Distant Site (Provider Location): Provider Remote Setting    Consent:  The patient/guardian has verbally consented to: the potential risks and benefits of telemedicine (video visit) versus in person care; bill my insurance or make self-payment for services provided; and responsibility for payment of non-covered services.     Mode of Communication:  Video Conference via [employer]    As the provider I attest to compliance with applicable laws and regulations related to telemedicine.

## 2020-08-27 ENCOUNTER — HOSPITAL ENCOUNTER (OUTPATIENT)
Dept: BEHAVIORAL HEALTH | Facility: CLINIC | Age: 25
End: 2020-08-27
Attending: FAMILY MEDICINE
Payer: COMMERCIAL

## 2020-08-27 PROCEDURE — H2035 A/D TX PROGRAM, PER HOUR: HCPCS | Mod: HQ,GT

## 2020-08-27 NOTE — GROUP NOTE
"Group Therapy Documentation    PATIENT'S NAME: Samuel Cowan  MRN:   [id number]  :   1995  ACCT. NUMBER: [id number]  DATE OF SERVICE: 20  START TIME:  5:30 PM  END TIME:  7:30 PM  FACILITATOR(S): Arnold Vasquez LADC  TOPIC: BEH Group Therapy  Number of patients attending the group:  8  Group Length:  2 Hours    Group Therapy Type: Life skill(s)    Summary of Group / Topics Discussed:    Coping Skills/Lifestyle Managemet and Choices in Recovery      Group Attendance:  Attended group session  Client needed to leave after one hour.  Patient's response to the group topic/interactions:  cooperative with task    Patient appeared to be Attentive and Engaged.        Client specific details:   Client listened to peer present treatment plan assignment in group and provided feedback.  Client finished video \"John to Self  and continued discussion about the challenges that can be anticipated in early recovery and the recommended habits/tools that can aid in the recovery process discussed in the video. Client discussed the level of structure that is still needed to support long term recovery.   This relates to clients Dim 5 treatment plan goal of Develop sober coping and living skills in order to address the development of a strategy for long term recovery AEB the above.      Telemedicine Visit: The patient's condition can be safely assessed and treated via synchronous audio and visual telemedicine encounter.      Reason for Telemedicine Visit: Services only offered telehealth    Originating Site (Patient Location): Patient's home    Distant Site (Provider Location): Provider Remote Setting    Consent:  The patient/guardian has verbally consented to: the potential risks and benefits of telemedicine (video visit) versus in person care; bill my insurance or make self-payment for services provided; and responsibility for payment of non-covered services.     Mode of Communication:  Video Conference    As the provider " I attest to compliance with applicable laws and regulations related to telemedicine.

## 2020-08-31 ENCOUNTER — HOSPITAL ENCOUNTER (OUTPATIENT)
Dept: BEHAVIORAL HEALTH | Facility: CLINIC | Age: 25
End: 2020-08-31
Attending: FAMILY MEDICINE
Payer: COMMERCIAL

## 2020-08-31 PROCEDURE — H2035 A/D TX PROGRAM, PER HOUR: HCPCS | Mod: HQ,GT

## 2020-08-31 NOTE — GROUP NOTE
"Group Therapy Documentation    PATIENT'S NAME: Samuel Cowan  MRN:   [id number]  :   1995  ACCT. NUMBER: [id number]  DATE OF SERVICE: 20  START TIME:  5:30 PM  END TIME:  7:30 PM  FACILITATOR(S): Arnold Vasquez LADC  TOPIC: BEH Group Therapy  Number of patients attending the group:  8  Group Length:  2 Hours    Group Therapy Type: Psychoeducation    Summary of Group / Topics Discussed:    Stress Management      Group Attendance:  Attended group session    Patient's response to the group topic/interactions:  cooperative with task    Patient appeared to be Attentive.        Client specific details:  Monday Client participated in weekly check-in process with group and identified 3 reasons to stay sober. Client watched video \"How to be good at Stress  and discussed the challenges that can be anticipated in early recovery and the recommended habits/tools that can aid develop stress tolerance in the recovery process. Clients explored the topic of building stress tolerance and resilience in recovery by examining stress triggering threats and strategies/Reponses to counter threats. Client learned to view stressful situations as opportunity to improve skills, knowledge and strengths that will allow the experience of stress  inoculation  or stress -related growth. This relates to clients Dim 3 treatment plan goal: Learn how to apply self-care and psychotherapy to build a repertoire of daily habits that counteract PAWS, fatigue, depression and anxiety leading to increased resiliency and well-being.      Telemedicine Visit: The patient's condition can be safely assessed and treated via synchronous audio and visual telemedicine encounter.      Reason for Telemedicine Visit: Services only offered telehealth    Originating Site (Patient Location): Patient's home    Distant Site (Provider Location): Provider Remote Setting    Consent:  The patient/guardian has verbally consented to: the potential risks and benefits " of telemedicine (video visit) versus in person care; bill my insurance or make self-payment for services provided; and responsibility for payment of non-covered services.     Mode of Communication:  Video Conference via [employer]    As the provider I attest to compliance with applicable laws and regulations related to telemedicine.

## 2020-09-02 ENCOUNTER — HOSPITAL ENCOUNTER (OUTPATIENT)
Dept: BEHAVIORAL HEALTH | Facility: CLINIC | Age: 25
End: 2020-09-02
Attending: FAMILY MEDICINE
Payer: COMMERCIAL

## 2020-09-02 PROCEDURE — H2035 A/D TX PROGRAM, PER HOUR: HCPCS | Mod: HQ,GT

## 2020-09-02 NOTE — GROUP NOTE
Group Therapy Documentation    PATIENT'S NAME: Samuel Cowan  MRN:   [id number]  :   1995  ACCT. NUMBER: [id number]  DATE OF SERVICE: 20  START TIME:  5:30 PM  END TIME:  7:30 PM  FACILITATOR(S): Arnold Vasquez LADC  TOPIC: BEH Group Therapy  Number of patients attending the group:  8  Group Length:  2 Hours    Group Therapy Type: Psychoeducation    Summary of Group / Topics Discussed:    Distress Tolerance      Group Attendance:  Attended group session    Patient's response to the group topic/interactions:  cooperative with task and discussed personal experience with topic    Patient appeared to be Attentive and Engaged.        Client specific details: Clients continued to explore the topic of building stress tolerance and resilience in recovery by examining stress triggering threats and strategies/Reponses to counter threats. Client learned to view stressful situations as opportunity to improve skills, knowledge and strengths and to practice Positive coping thoughts that can help you tolerate distressing situations by giving you strength and motivation to endure a difficult situation.  Clients began discussion of how to how Negative thoughts include inaccurate exaggerations, irrelevant thoughts, misperceptions, doubts, deceptions, and lies that people tell themselves.  Thoughts, feelings, and beliefs often become confused and distorted, especially with substance use often leading to possible relapse situations. Client shared negative/inaccurate thought of    have been a challenge to recovery. Client indicated he would try practice increasing awareness to these situations and, using positive self talk, be more effective at responding vs reacting to negative thinking.This relates to clients Dim 3 treatment plan goal: Learn how to apply self-care and psychotherapy to build a repertoire of daily habits that counteract PAWS, fatigue, depression and anxiety leading to increased resiliency and  well-being..    Telemedicine Visit: The patient's condition can be safely assessed and treated via synchronous audio and visual telemedicine encounter.      Reason for Telemedicine Visit: Services only offered telehealth    Originating Site (Patient Location): Patient's home    Distant Site (Provider Location): Provider Remote Setting    Consent:  The patient/guardian has verbally consented to: the potential risks and benefits of telemedicine (video visit) versus in person care; bill my insurance or make self-payment for services provided; and responsibility for payment of non-covered services.     Mode of Communication:  Video Conference via [employer]    As the provider I attest to compliance with applicable laws and regulations related to telemedicine.

## 2020-09-02 NOTE — PROGRESS NOTES
[facility] INTENSIVE OUTPATIENT PROGRAM  TRANSITION PROGRESS NOTE     Patient: Samuel Cowan  MRN: [id number]   : 1995  Age: 25 year old Sex: female  IOP ADMISSION DATE: : 20  TRANSITION DATE: 20  TRANSITIONING FROM: Phase 2 TO: Phase 3  SUBSTANCE USE DISORDERS:   Alcohol Use Disorder Severe - 303.90 (F10.20)  Cannabis Use Disorder Moderate - 304.30 (F12.20)   LAST USE DATE: 20    Treatment Plan Review completed on:  2020     Total # of Phase 1 Group Sessions:      Total # of Phase 2 Group Sessions: 15 through 20  Total # of Phase 3 Group Sessions: N/A  Total # of 1:1 Sessions:   Length of stay/projected discharge date: 10/27/20     Client preferred learning style: by reading, by hands-on practice and by watching someone else demonstrate     Staff member(s) contributing: AMENA Subramanian      Received supervision: No.     Client involvement with treatment planning: contributed to goals and plan.     Client received copy of plan/revised plan: Yes     Client agrees with plan/revised plan: Yes     Changes to Treatment Plan: No     New Goals added since last review: None.     Goal(s) worked on since last review: Dim 3 treatment plan goal of Learn how to apply self-care and psychotherapy to build a repertoire of daily habits that counteract PAWS, fatigue, depression and anxiety leading to increased resiliency and well-being, Dim 4- understand the impact your substance use has had on his significant relationships, Dim 5 treatment plan goal of Develop sober coping and living skills in order to address the development of a strategy for long term recovery      Strategies effective: Yes     Treatment Coordination Activities: None.     Medical, Mental Health and other appointments the client attended: None.     Medication issues: None.     Physical and mental health problems: Yes - Client has thoughts of suicide or harm to others without means; however, the thoughts may  interfere with participation in some treatment activities. Client has difficulty functioning in significant life areas. Client has moderate symptoms of emotional, behavioral, or cognitive problems. Client reports no SI/SH in Phase 1 or 2.     Review and evaluation of the individual abuse prevention plan: The program's individual abuse prevention plan (IAPP) is sufficient for this client.     Dimension One: Acute Intoxication/Withdrawal Potential     Risk at admission to IOP: 0. Risk at transition to Phase 3: Risk Ratin.     Treatment plan goal:   Develop effective strategies to maintain sobriety. Goal Status: CONTINUE.     Current ASAM criteria: no withdrawal risk.     Treatment Summary/Justification of Transition DIM 1: No use episodes in IOP CD Phase I and II.     Dimension Two: Biomedical Conditions and Complaints     Risk at admission to IOP: 1. Risk at transition to Phase 3: Risk Ratin.     Treatment plan goal:   Client will effectively manage her physical health and will follow recommendations of your medical provider. CONTINUE.      Current ASAM criteria: stable and manageable.     Treatment Summary/Justification of Transition DIM 2: Client reports no medical conditions that would interfere with treatment. She reports having a PCP and is able to obtain services she needs.  Dimension Three: Emotional/Behavioral/Cognitive Conditions and Complications     Risk at admission to IOP: 2. Risk at transition to Phase 3: Risk Ratin.     Treatment plan goal:   Learn how to apply self-care and psychotherapy to build a repertoire of daily habits that counteract PAWS, fatigue, depression and anxiety leading to increased resiliency and well-being. CONTINUE.     Current ASAM criteria: stable and manageable. . Has a mental health diagnosis and is stable. Functions adequately in significant life areas.       Treatment Summary/Justification of Transition DIM 3: Client reports dx for Bipolar disorder.  and  Posttraumatic stress disorder. She reports taking MH medications.    Dimension Four: Readiness to Change     Risk at admission to IOP: 1. Risk at transition to Phase 3: Risk Ratin.     Treatment plan goal:   Increase and/or maintain internal motivation to achieve sobriety from substances. CONTINUE.     Current ASAM criteria: ready for recovery and needs strategies to strengthen readiness.     Treatment Summary/Justification of Transition DIM 4: Client reports attending sober support.     Dimension Five: Relapse/Continues Use/Continues Problem Potential     Risk at admission to IOP: 3. Risk at transition to Phase 3: Risk Ratin.     Treatment plan goal:   Establish a routine supportive of long-term sobriety. CONTINUE.     Current ASAM criteria: recognizes relapse issues and prevention strategies but displays some vulnerability for further substance use problems     Treatment Summary/Justification of Transition DIM 5: Client reports she is regularly meeting with a group of sober women. No use episodes reported during IOP Phase I and II.      Dimension Six: Recovery Environment     Risk at admission to IOP: 2. Risk at transition to Phase 3: Risk Ratin.     Treatment plan goal:   Expand sober support network and be involved in sober activities. CONTINUE.     Current ASAM criteria: engaged in structured and meaningful activity     Treatment Summary/Justification of Transition DIM 6: Client is employed full-time, lives alone in her apartment, She reports a safe environment and reports regular sober support meetings.  Client reports sober activities and being of help to others.  PLAN: Transition client to Phase 3 effective 20 due to see above.   Client currently meets the ASAM criteria for; Outpatient (ASAM level 1)  level of care.    AMENA Subramanian

## 2020-09-08 ENCOUNTER — HOSPITAL ENCOUNTER (OUTPATIENT)
Dept: BEHAVIORAL HEALTH | Facility: CLINIC | Age: 25
End: 2020-09-08
Attending: FAMILY MEDICINE
Payer: COMMERCIAL

## 2020-09-08 PROCEDURE — H2035 A/D TX PROGRAM, PER HOUR: HCPCS | Mod: HQ,GT

## 2020-09-08 NOTE — GROUP NOTE
Group Therapy Documentation    PATIENT'S NAME: Samuel Cowan  MRN:   [id number]  :   1995  ACCT. NUMBER: [id number]  DATE OF SERVICE: 20  START TIME:  5:30 PM  END TIME:  7:30 PM  FACILITATOR(S): Arnold Vasquez LADC  TOPIC: BEH Group Therapy  Number of patients attending the group:  4  Group Length:  2 Hours    Group Therapy Type: Life skill(s)    Summary of Group / Topics Discussed:    Choices in Recovery  Clients entering Phase 3 discussed the skills they learned and are practicing in early recovery. Discussed (awareness, connections, coping skills etc) that are missing and need to be obtained as well as areas they continue to struggle in recovery. Reviewed D5 treatment goal progress.    Group Attendance:  Attended group session    Patient's response to the group topic/interactions:  cooperative with task    Patient appeared to be Attentive.        Client specific details:  Client shared that she has thoughts of using on weekends and at night when she's bored. She uses spiritual reading as a coping skil. Client also acknowledge to group peers that she needs to get busy organizing daily structure that will support her recovery in those times. D5.    Telemedicine Visit: The patient's condition can be safely assessed and treated via synchronous audio and visual telemedicine encounter.      Reason for Telemedicine Visit: Services only offered telehealth    Originating Site (Patient Location): Patient's home    Distant Site (Provider Location): Provider Remote Setting    Consent:  The patient/guardian has verbally consented to: the potential risks and benefits of telemedicine (video visit) versus in person care; bill my insurance or make self-payment for services provided; and responsibility for payment of non-covered services.     Mode of Communication:  Video Conference via [employer]    As the provider I attest to compliance with applicable laws and regulations related to telemedicine.

## 2020-09-15 ENCOUNTER — HOSPITAL ENCOUNTER (OUTPATIENT)
Dept: BEHAVIORAL HEALTH | Facility: CLINIC | Age: 25
End: 2020-09-15
Attending: FAMILY MEDICINE
Payer: COMMERCIAL

## 2020-09-15 PROCEDURE — H2035 A/D TX PROGRAM, PER HOUR: HCPCS | Mod: HQ,GT

## 2020-09-16 NOTE — GROUP NOTE
Group Therapy Documentation    PATIENT'S NAME: Samuel Cowan  MRN:   [id number]  :   1995  ACCT. NUMBER: [id number]  DATE OF SERVICE: 9/15/20  START TIME:  5:30 PM  END TIME:  7:30 PM  FACILITATOR(S): Dom Mcclellan  TOPIC: BEH Group Therapy  Number of patients attending the group:  4  Group Length:  2 Hours    Group Therapy Type: Psychotherapeutic    Summary of Group / Topics Discussed:    Meditation/Breathing Exercises, Relaxation Techniques, and Craving Reduction      In today's session clients practiced the 478-breathing exercise for stress reduction, lowering anxiety and craving reduction. This relates to clients Dim 3 treatment plan goal: Learn how to apply self-care and psychotherapy to build a repertoire of daily habits that counteract PAWS, fatigue, depression and anxiety leading to increased resiliency and well-being. Client then participated in a weekly check in process and shared about their level of cravings for their drug of choice over the past week. A daily reading on detachment in relationships was read and discussed by clients in this session.    Telemedicine Visit: The patient's condition can be safely assessed and treated via synchronous audio and visual telemedicine encounter.      Reason for Telemedicine Visit: Services only offered telehealth     Originating Site (Patient Location): Patient's home     Distant Site (Provider Location): [facility] Outpatient Setting: [place]     Consent:  The patient/guardian has verbally consented to: the potential risks and benefits of telemedicine (video visit) versus in person care; bill my insurance or make self-payment for services provided; and responsibility for payment of non-covered services.      Mode of Communication:  Video Conference via [employer]     As the provider I attest to compliance with applicable laws and regulations related to telemedicine.       Group Attendance:  Attended group session    Patient's response to  the group topic/interactions:  cooperative with task    Patient appeared to be Attentive.        Client specific details:  Client specific details:  In this session client practiced the 478-breathing exercise. Client shared that she found this exercise relaxing and that she uses this exercise when she has anxiety or for sleep. This relates to clients Dim 3 treatment plan goal: Learn how to apply self-care and psychotherapy to build a repertoire of daily habits that counteract PAWS, fatigue, depression and anxiety leading to increased resiliency and well-being. Client then checked in and shared that her cravings have been lower than normal over the past, she believes this is due to her stress level not being so high. She shared that she is finding that detaching in her relationships with family creates less drama.     Plan: Practice a breathing, mindfulness or meditation exercise 2 times daily and report back to group next week how doing this was for you.

## 2020-10-06 ENCOUNTER — HOSPITAL ENCOUNTER (OUTPATIENT)
Dept: BEHAVIORAL HEALTH | Facility: CLINIC | Age: 25
End: 2020-10-06
Attending: FAMILY MEDICINE
Payer: COMMERCIAL

## 2020-10-06 PROCEDURE — H2035 A/D TX PROGRAM, PER HOUR: HCPCS | Mod: HQ,95

## 2020-10-06 NOTE — GROUP NOTE
"Group Therapy Documentation    PATIENT'S NAME: Samuel Cowan  MRN:   [id number]  :   1995  ACCT. NUMBER: [id number]  DATE OF SERVICE: 10/06/20  START TIME:  5:30 PM  END TIME:  7:30 PM  FACILITATOR(S): Arnold Vasquez LADC  TOPIC: BEH Group Therapy  Number of patients attending the group:  4  Group Length:  2 Hours    Group Therapy Type: Life skill(s)    Summary of Group / Topics Discussed:    Choices in Recovery. Clients processed the \"Miracle Question\" and shared their thoughts in group designed to help client identify goals.      Group Attendance:  Attended group session    Patient's response to the group topic/interactions:  cooperative with task    Patient appeared to be Attentive.        Client specific details:  Client shared she has been busy helping her sister move in that is pregnant. She said the CPS order is up 11/3 when she gets her kids back. Client shared that she has done nothing to support her recovery in the last couple week. Group peers reminder her that she need to look at her priorities. Recovery needs to be first because without it, you don't get your kids back, can't be a mother, lose your job.. Client surprised everyone by asking about the meeting some peers were planning to attend tomorrow and said she would like to go with them.This relates to Dim 4 Goal to increase motivation to change and DIM 5 treatment plan goal to develop sober coping and living skills in order to address the development of a strategy for long term recovery.    Telemedicine Visit: The patient's condition can be safely assessed and treated via synchronous audio and visual telemedicine encounter.      Reason for Telemedicine Visit: Services only offered telehealth    Originating Site (Patient Location): Patient's home    Distant Site (Provider Location): Provider Remote Setting    Consent:  The patient/guardian has verbally consented to: the potential risks and benefits of telemedicine (video visit) versus in " person care; bill my insurance or make self-payment for services provided; and responsibility for payment of non-covered services.     Mode of Communication:  Video Conference via [employer]    As the provider I attest to compliance with applicable laws and regulations related to telemedicine.

## 2020-10-07 NOTE — TREATMENT PLAN
[facility]  Adult Chemical Dependency Program  Treatment Plan Requirements    These services are provided by the facility for each patient/client according to the individual's treatment plan:    Individual and group counseling    Education    Transition services    Services to address any co-occurring mental illness    Service coordination    Initial Treatment Plan Goals:  1. Complete all the requirements of Program Orientation.  2. Maintain medication compliance throughout the program.  3. Complete requirements for workshop/skills groups based on identified issues on your problem list.  4. Complete the support group attendance feedback sheet weekly.  5. Gain family involvement in treatment process to address family issues from the problem list.  6. Attend and participate in all required groups per individual treatment plan.  7. Focus attention to individualized issues from the treatment plan.  8. Complete all requirements for UA's, alcohol screening tests and other testing.  9. Schedule a physical examination if recommended.    In addition to the above, complete all individual goals as specifically outlines on your treatment plan.    Criteria for discharge:  Patients/clients are discharged from the program following completion of the entire program including Phase I and II or acceptance of other post-treatment referrals such as longterm house, or aftercare at other facilities.  Patients/clients may also be discharged for inappropriate behavior or chemical use.      Favorable Discharge - Patients/clients have completed agreed upon treatment goals, understand their diagnosis and appear motivated about the follow-up care.    Guarded Discharge - Patients/clients have demonstrated some understanding of their diagnosis and recovery process, and have completed some of their treatment goals.  This prognosis also includes patients/clients who have completed some treatment goals but have not made  commitment to community support or follow through with referrals.    Unfavorable Discharge - Patients/clients have not completed agreed upon treatment goals due to their own choice, have limited understanding of their diagnosis, and have shown minimal or inconsistent behavior conducive to recovery.  Those patients/clients discharged due to behavioral problems will also be unfavorable discharges.                                  Adult CD Treatment Plan     Samuel Cowan [id number]   5/6/2020 24 year old female        ------------------------------------------------------------------------------------------------------------------  Acute Intoxication/Withdrawal Potential     DIMENSION 1  RISK FACTOR: 0      SUBSTANCE USE DISORDERS:  Alcohol Use Disorder Severe - 303.90 (F10.20)  Cannabis Use Disorder Moderate - 304.30 (F12.20)             Assignment Date Source Prob/Goal/  Intervention Target  Date Initials Outcome Completion  Date   5/6/2020 Self -  Current History -  Current Assessment -  Current  Area of Treatment Focus: Substance use, cravings and urges.  Last use date was reported as 04/23/2020.       Goal:   Develop effective strategies to maintain sobriety.      Treatment Strategies:  (Note: Must indicate the amount and frequency for each strategy)    Report to counselor and group any alcohol or drug use.                                     Ongoing through  9/17/20                                   JK                                   Effective                                   12/15/20         Biomedical Conditions and Complaints       DIMENSION 2  RISK FACTOR: 1               Assignment Date Source Prob/Goal/  Intervention Target  Date Initials Outcome Completion  Date   5/6/2020 Self -  Current History -  Current Assessment -  Current  Area of Treatment Focus: None    Goal:   Obtain a medical evaluation.      Disclose CD status to medical providers and follow up with medical interventions while in IOP.    Maintain good physical health.    Treatment Strategies:  (Note: Must indicate the amount and frequency for each strategy)    Report change in severity of symptoms to staff.     Continue to take prescribed medications and follow-up with medical interventions while in program.    Exercise at least 30 minutes/3 times per week.                                             Ongoing through  9/17/20    Ongoing through  9/17/20          Ongoing through  9/17/20                                                 QUINN QUIROSK                                             Effective        Effective              Effective                                             12/15/20        12/15/20              12/15/20       -----------------------------------------------------------------------------------------------------------------    Emotional/Behavioral/Cognitive Conditions and Complications       DIMENSION 3  RISK FACTOR: 2               Assignment Date Source Prob/Goal/  Intervention Target  Date Outcome Completion  Date   5/6/2020 Self -  Current History -  Current Assessment -  Current   Area of Treatment Focus:  Client lacks understanding of addiction as a disease and how this disorder affects other aspects of mental and physical health.     Goal:   Learn how to apply self-care and psychotherapy to build a repertoire of daily habits that counteract PAWS, fatigue, depression and anxiety leading to increased resiliency and well-being.      Treatment Strategies:  Attend each of the following groups one time a week and complete all 6 by due date, unless excused by primary counselor.  All group work to be completed each session to receive an effective outcome.      > Neurobiology of addiction: Informs client of brain changes and reduces feelings of shame, instructs on steps to help heal     > Learn and use Mindfulness to facilitate effective action in problem solving and promote health and well-being      > Mental  Health Part 1: Learn 3 core processes of Acceptance,   Cognitive De-fusion, and Being Present     > Mental Health Part 2: Learn 3 core processes of Self-as-Context, Values, and Committed Action      > Learn and apply Self-Care in a variety of areas to improve mental and physical health, reduce PAWS, and increase feelings of self-empowerment, resiliency and well-being     > Learn Stress Management techniques to reduce PAWS and stress-related symptoms, increase resiliency, and learn healthy routines to replace dysfunctional habits                                                                          [Absent]  Effective             Effective               Effective               Effective               [Excused]  Effective                   Effective                                                                                        [06/09/20]  07/21/20 05/05/20 05/12/20 05/19/20 6/23/2020            [05/26/20]  07/07/20 06/02/20                           -------------------------------------------------------------------------------------------------------------------     Readiness to Change       DIMENSION 4  RISK FACTOR: 1               Assignment Date Source Prob/Goal/  Intervention Target  Date Initials Outcome Completion  Date   5/6/2020 Self -  Current History -  Current Assessment -  Current  Area of Treatment Focus: {Client/Patient lacks internal motivation to stop using substances.      Goal:      Understand the impact your substance use has had on you, your family, and significant relationships.    Increase internal motivation to change.    Treatment Strategies:  (Note: Must indicate the amount and frequency for each strategy)     Present 10 [place] assignment.  Once.  First half:    Second half:       Each week write down 3 reasons you want to remain sober.     Complete Symptoms assignment. Once.                                                        By: 5/21/20 5/28/20    Ongoing through  9/17/20 6/18/20                                                           QUINN BALL                                                         Effective    Effective    Effective        Effective                                                           5/13/20    5/14/20    12/15/20        6/18/20       -------------------------------------------------------------------------------------------------------------------     Relapse/Continues Use/Continues Problem Potential       DIMENSION 5  RISK FACTOR: 3                   Assignment Date Source Prob/Goal/  Intervention Target  Date Initials Outcome Completion  Date   5/6/2020 Self -  Current History -  Current Assessment -  Current  Area of Treatment Focus: Lacks a daily routine that includes healthy and sober living skills.    and Client/Patient does not recognize relapse triggers and warning signs.       Goal:     Identify personal triggers and relapse warning signs.      Develop sober coping and living skills in order to address the development of a strategy for long term recovery.    Treatment Strategies:  (Note: Must indicate the amount and frequency for each strategy)     Develop a relapse prevention plan including lifestyle changes, recovery activities, daily structure, self-care, support systems, spirituality, work, finances, recreation and crisis management. Once.                                                             By:  7/9/20                                                             JK                                                             Effective                                                                   7/16/20             Recovery Environment       DIMENSION 6  RISK FACTOR: 2                   AssignmentDate Source Prob/Goal/  Intervention Target  Date Initials Outcome Completion  Date   5/6/2020 Self -  Current History  "-  Current Assessment -  Current  Area of Treatment Focus: Lacks sober support network.       Goal:   Increase sober support network. Identify and attend sober support group meetings.    Treatment Strategies:  (Note: Must indicate the amount and frequency for each strategy)     Complete the personal \"Recovery Care Assignment\" Once.                                     By:  9/10/20                                       JK                                     Effective                                     12/15/20     Individual abuse prevention plan (required for lodging plus) : specific actions, referral:   No additional protection measures required other than the Program Abuse Prevention Plan - The program's individual abuse prevention plan (IAPP) is sufficient for this client.      All interventions that are designated as current will need to be completed in order to transition out of treatment with a favorable prognosis. The treatment plan is a flexible document and a work in progress. Interventions and goals may be added at any time to customize this plan to each individual's needs. Client may work with clinician to change interventions as long as they pertain to the goals stipulated in the plan and/or are clinically driven.    AMENA Subramanian    Acknowledgement of Current Treatment Plan - Initial Treatment Plan     INITIAL TREATMENT PLAN:     1. I have participated in creating my treatment plan with my therapist / counselor on 5/6/2020     I agree with the plan as it is written in the electronic health record.    Name Signature/Date   Samuel Cowan    Name of Therapist / Counselor Signature/Date   AMENA Subramanian      2. I have completed and reviewed my Safety Plan with my counselor and signed this on 5/6/20. I have been given the hard copy of this plan.    Patient signature/date:      ___________________________________________________________________5/6/2020    3. Last Use Date: As Client reported: " 04/23/2020    Patient signature/date:     ____________________________________________________________________5/6/2020

## 2020-10-08 NOTE — PROGRESS NOTES
Samuel TED Cowan  [id number]    Treatment Plan Review     Total # of Phase 1 Group Sessions:      Total # of Phase 2 Group Sessions:   Total # of Phase 3 Group Sessions: 3 thru 10/6  Total # of 1:1 Sessions: 0    Support group attended this week: no    Reporting sobriety: Yes    Treatment Plan Review completed on:  10/01258     Projected discharge date: 11/3/20    Client preferred learning style: by reading, by hands-on practice and by watching someone else demonstrate    Staff member(s) contributing: Arnold Vasquez [facility]     Received supervision: No.    Client involvement with treatment planning: contributed to goals and plan.    Client received copy of plan/revised plan: Yes    Client agrees with plan/revised plan: Yes    Changes to Treatment Plan: No    New Goals added since last review: None.    Goal(s) worked on since last review: Dimension 4: Increase motivation for recovery Dimension 5: Identify personal triggers and relapse warning signs. Develop sober coping and living skills in order to address the development of a strategy for long term recovery.  Strategies effective: Yes    Treatment Coordination Activities: None.    Medical, Mental Health and other appointments the client attended: None.    Medication issues: None.    Physical and mental health problems: Yes - Client has thoughts of suicide or harm to others without means; however, the thoughts may interfere with participation in some treatment activities. Client has difficulty functioning in significant life areas. Client has moderate symptoms of emotional, behavioral, or cognitive problems. Client reports no SI/SH in last 30 days/    Review and evaluation of the individual abuse prevention plan: The program's individual abuse prevention plan (IAPP) is sufficient for this client.     Substance Use Disorders:    Alcohol Use Disorder Severe - 303.90 (F10.20)  Cannabis Use Disorder Moderate - 304.30 (F12.20)    ASAM Risk Rating: (Note the rationale for  risk rating changes)  Dimension 1 0 Client displays full functioning. No signs or symptoms of intoxication or withdrawal. Client currently rates desire to use substances at 2/10.     Dimension 2 1 The patient reported being in good overall physical health and she denied having any history of chronic medical problems.  The patient denied taking any prescription or OTC medications at this time.  Client reports exercise of walking.      Dimension 3 2 Client has difficulty with impulse control and lacks coping skills. Client has thoughts of suicide or harm to others without means; however, the thoughts may interfere with participation in some treatment activities. Client has difficulty functioning in significant life areas. Client has moderate symptoms of emotional, behavioral, or cognitive problems. Client is able to participate in most treatment activities.      Dimension 4 1 Client is motivated with active reinforcement, to explore treatment and strategies for change, but ambivalent about illness or need for change.Client needs to start exporing sober support more consistently. Client has missed last weeks group and scheduled 1:1 with counselor. She reported being busy moving her sister into her apartment on short notice.       Dimension 5 3 Client has poor recognition and understanding of relapse and recidivism issues and displays moderately high vulnerability for further substance use or mental health problems. Client has few coping skills and rarely applies coping skills.Client reports few sober friends and acknowledges need to find sober friends. Client asked peers if she could join them in attending sober support this week.     Dimension 6 2 Patient liveswith her sister in her apartment. Patient is employed, working 25-30 hours per week.  Patient reports having 2 children minor children who live with her mother. She reports that the CPS order expires 11/3 at which time she will get custody of her 2 kids. Patient  denies any past or current legal issues.      Client will participate virtual sober meetings, make calls to group peers for support and practice self-care.    BLESSING Subramanian

## 2020-10-08 NOTE — ADDENDUM NOTE
Encounter addended by: Arnold Vasquez LADC on: 10/8/2020 1:51 PM   Actions taken: Clinical Note Signed

## 2020-10-13 ENCOUNTER — HOSPITAL ENCOUNTER (OUTPATIENT)
Dept: BEHAVIORAL HEALTH | Facility: CLINIC | Age: 25
End: 2020-10-13
Attending: FAMILY MEDICINE
Payer: COMMERCIAL

## 2020-10-13 PROCEDURE — H2035 A/D TX PROGRAM, PER HOUR: HCPCS | Mod: HQ,95

## 2020-10-13 NOTE — GROUP NOTE
Group Therapy Documentation    PATIENT'S NAME: Samuel Cowan  MRN:   [id number]  :   1995  ACCT. NUMBER: [id number]  DATE OF SERVICE: 10/13/20  START TIME:  5:30 PM  END TIME:  7:30 PM  FACILITATOR(S): Arnold Vasquez LADC  TOPIC: BEH Group Therapy  Number of patients attending the group:  4  Group Length:  2 Hours    Group Therapy Type: Life skill(s)    Summary of Group / Topics Discussed:    Choices in Recovery. Getting Unstuck in Recovery.      Group Attendance:  Attended group session    Patient's response to the group topic/interactions:  cooperative with task, discussed personal experience with topic and expressed readiness to alter behaviors    Patient appeared to be Attentive and Engaged.        Client specific details:  Client surprised group by sharing she attended an [employer] meet today at noon. She appeared to be very happy saying everyone was very nice and supportive. She said she intends to go back again next Tuesday.. This relates to clients Dim 5 treatment plan goal of Develop sober coping and living skills in order to address the development of a strategy for long term recovery AEB the above.    Telemedicine Visit: The patient's condition can be safely assessed and treated via synchronous audio and visual telemedicine encounter.      Reason for Telemedicine Visit: Services only offered telehealth    Originating Site (Patient Location): Patient's home    Distant Site (Provider Location): Provider Remote Setting    Consent:  The patient/guardian has verbally consented to: the potential risks and benefits of telemedicine (video visit) versus in person care; bill my insurance or make self-payment for services provided; and responsibility for payment of non-covered services.     Mode of Communication:  Video Conference via [employer]    As the provider I attest to compliance with applicable laws and regulations related to telemedicine.

## 2020-10-14 NOTE — PROGRESS NOTES
Samuel TED Cowan  [id number]    Treatment Plan Review     Total # of Phase 1 Group Sessions:      Total # of Phase 2 Group Sessions:   Total # of Phase 3 Group Sessions: 4 thru 10/13  Total # of 1:1 Sessions: 0    Support group attended this week: no    Reporting sobriety: Yes    Treatment Plan Review completed on:  10/14/2020     Projected discharge date: 11/3/20    Client preferred learning style: by reading, by hands-on practice and by watching someone else demonstrate    Staff member(s) contributing[facility]     Received supervision: No.    Client involvement with treatment planning: contributed to goals and plan.    Client received copy of plan/revised plan: Yes    Client agrees with plan/revised plan: Yes    Changes to Treatment Plan: No    New Goals added since last review: None.    Goal(s) worked on since last review: Dimension 4: Increase motivation for recovery Dimension 5: Identify personal triggers and relapse warning signs. Develop sober coping and living skills in order to address the development of a strategy for long term recovery.    Strategies effective: Yes    Treatment Coordination Activities: None.    Medical, Mental Health and other appointments the client attended: None.    Medication issues: None.    Physical and mental health problems: Yes - Client has thoughts of suicide or harm to others without means; however, the thoughts may interfere with participation in some treatment activities. Client has difficulty functioning in significant life areas. Client has moderate symptoms of emotional, behavioral, or cognitive problems. Client reports no SI/SH in last 30 days/    Review and evaluation of the individual abuse prevention plan: The program's individual abuse prevention plan (IAPP) is sufficient for this client.     Substance Use Disorders:    Alcohol Use Disorder Severe - 303.90 (F10.20)  Cannabis Use Disorder Moderate - 304.30 (F12.20)    ASAM Risk Rating: (Note the rationale  for risk rating changes)  Dimension 1 0 Client displays full functioning. No signs or symptoms of intoxication or withdrawal. Client currently rates desire to use substances at 2/10.     Dimension 2 1 The patient reported being in good overall physical health and she denied having any history of chronic medical problems.  The patient denied taking any prescription or OTC medications at this time.  Client reports exercise of walking.      Dimension 3 2 Client has difficulty with impulse control and lacks coping skills. Client has thoughts of suicide or harm to others without means; however, the thoughts may interfere with participation in some treatment activities. Client has difficulty functioning in significant life areas. Client has moderate symptoms of emotional, behavioral, or cognitive problems. Client is able to participate in most treatment activities.      Dimension 4 1 Client is motivated with active reinforcement, to explore treatment and strategies for change, but ambivalent about illness or need for change.Client needs to start exporing sober support more consistently. Client reported attending sober support on Tuesday. [place] said she plans to go back next week.    Dimension 5 2 Client has poor recognition and understanding of relapse and recidivism issues and displays moderately high vulnerability for further substance use or mental health problems. Client has few coping skills and rarely applies coping skills.Client reports few sober friends and acknowledges need to find sober friends. Client asked peers if she could join them in attending sober support this week.     Dimension 6 2 Patient liveswith her sister in her apartment. Patient is employed, working 25-30 hours per week.  Patient reports having 2 children minor children who live with her mother. She reports that the CPS order expires 11/3 at which time she will get custody of her 2 kids. Patient denies any past or current legal issues.       Client will participate virtual sober meetings, make calls to group peers for support and practice self-care.    BLESSING Subramanian

## 2020-10-20 ENCOUNTER — HOSPITAL ENCOUNTER (OUTPATIENT)
Dept: BEHAVIORAL HEALTH | Facility: CLINIC | Age: 25
End: 2020-10-20
Attending: FAMILY MEDICINE
Payer: COMMERCIAL

## 2020-10-20 PROCEDURE — H2035 A/D TX PROGRAM, PER HOUR: HCPCS | Mod: HQ,GT

## 2020-10-20 NOTE — GROUP NOTE
Group Therapy Documentation    PATIENT'S NAME: Samuel Cowan  MRN:   [id number]  :   1995  ACCT. NUMBER: [id number]  DATE OF SERVICE: 10/20/20  START TIME:  5:30 PM  END TIME:  7:30 PM  FACILITATOR(S): Arnold Vasquez LADC  TOPIC: BEH Group Therapy  Number of patients attending the group: 4  Group Length:  2 Hours    Group Therapy Type: Life skill(s)    Summary of Group / Topics Discussed:    Psychoeducation/Skills Goal Setting (ANAYA)  This topic will give a general overview of short, intermediate and long term goals including the value of goal setting. It will explore how the pursuit of instant gratification adversely affects the ability to reach goals.  This topic will assist the patients in completing their recovery care plans.    Objective(s):     Patients will identify the characteristics of short, intermediate and long term goals.    Patients will be able to list one personal goal under each category    Patients will identify at least one way instant gratification impacts their ability to reach goals    Structure (modalities, homework, worksheets, etc):     Provide psychoeducation on goal setting and overcoming obstacles to goal attainment.    Facilitate group discussion around each patient s current state of goal setting and attainment.    Complete a worksheet on personal goal setting    Expected therapeutic outcome(s):   Patient will:    Set goals and negotiate obstacles to reaching the goals     Experience a higher incidence of goal attainment    Therapeutic outcome(s) measured by:     Completion of goal setting worksheet      Group Attendance:  Attended group session    Patient's response to the group topic/interactions:  cooperative with task    Patient appeared to be Attentive.        Client specific details:  Client identified goal is for her life to have purpose. She knows her goals of completing school and being a great mother can't happen without her recovery. Client shared she returned to  her AA meeting again this week.This relates to DIM 5 treatment plan goal to develop sober coping and living skills in order to address the development of a strategy for long term recovery.    Telemedicine Visit: The patient's condition can be safely assessed and treated via synchronous audio and visual telemedicine encounter.      Reason for Telemedicine Visit: Services only offered telehealth    Originating Site (Patient Location): Patient's home    Distant Site (Provider Location): Provider Remote Setting    Consent:  The patient/guardian has verbally consented to: the potential risks and benefits of telemedicine (video visit) versus in person care; bill my insurance or make self-payment for services provided; and responsibility for payment of non-covered services.     Mode of Communication:  Video Conference via [employer]    As the provider I attest to compliance with applicable laws and regulations related to telemedicine.

## 2020-10-21 NOTE — PROGRESS NOTES
"Samuel Cowan  [id number]    Treatment Plan Review     Treatment Plan Review completed on:  10/21/2020 for Review Period: Monday, 10/12/20 thru Thursday, 10/15/20    Total # of Phase 1 Group Sessions:      Total # of Phase 2 Group Sessions:   Total # of Phase 3 Group Sessions: 1   5 thr 10/20  Total # of 1:1 Sessions:   Projected discharge date: 11/3/20    Group Attendance Dates and Clients Response:   10/13/20, Response to group (ie-attentive, engaged and participatory): Samuel was engaged and participatory in this group.     Skills Group Sessions Hours: 2  Psychoeducational Group Hours:   Individual Hours:   Absences:     Note: P1= Phase I IOP & P2= Phase II OP  (IE-P1-3hrs)   MONDAY TUESDAY WEDNESDAY THURSDAY FRIDAY SATURDAY SUNDAY TOTAL HOURS   Skills Group Sessions           Psycho-  Educational Groups            Specialty Groups*            1:1 Sessions           [place]/SI             Phase III    2      2   Absent (place \"X\")             Total's & Client's Response  2      2   *Specialty Groups include Mental Health Care, Assertiveness and Communication, Sobriety Maintenance Skills, Spiritual Care, Stress Management, Relapse Prevention, Family Systems.           Support group attended this week: no     Reporting sobriety: Yes     Treatment Plan Review completed on:  10/14/2020      Projected discharge date: 11/3/20     Client preferred learning style: by reading, by hands-on practice and by watching someone else demonstrate     Staff member(s) contributing: AMENA Subramanian      Received supervision: No.     Client involvement with treatment planning: contributed to goals and plan.     Client received copy of plan/revised plan: Yes     Client agrees with plan/revised plan: Yes     Changes to Treatment Plan: No     New Goals added since last review: None.     Goal(s) worked on since last review: Dimension 4: Increase motivation for recovery Dimension 5: Identify personal triggers and relapse warning " signs. Develop sober coping and living skills in order to address the development of a strategy for long term recovery.     Strategies effective: Yes     Treatment Coordination Activities: None.     Medical, Mental Health and other appointments the client attended: None.     Medication issues: None.    Physical and mental health problems:No. Client reports no SI/SH in last 30 days.     Review and evaluation of the individual abuse prevention plan: The program's individual abuse prevention plan (IAPP) is sufficient for this client.   Substance Use Disorders:    Alcohol Use Disorder Severe - 303.90 (F10.20)  Cannabis Use Disorder Moderate - 304.30 (F12.20)    ASAM Risk Rating: (Note the rationale for risk rating changes)  Dimension 1 0 Client displays full functioning. No signs or symptoms of intoxication or withdrawal. Client currently rates desire to use substances at 0/10.   Dimension 2 1 The patient reported being in good overall physical health and she denied having any history of chronic medical problems.  The patient denied taking any prescription or OTC medications at this time.  Client reports exercise of walking.     Dimension 3 2 Client has difficulty with impulse control and lacks coping skills. Client has thoughts of suicide or harm to others without means; however, the thoughts may interfere with participation in some treatment activities. Client has difficulty functioning in significant life areas. Client has moderate symptoms of emotional, behavioral, or cognitive problems. Client is able to participate in most treatment activities.     Dimension 4 1 Client is motivated with active reinforcement, to explore treatment and strategies for change, but ambivalent about illness or need for change.Client needs to start exporing sober support more consistently. Client reported attending sober support on Tuesday. Client attended AA meeting again this week and plans to continue.  Dimension 5  1Client has minimal  relapse potential, fair ability to care for self. Client reports making sober friends. Attending sober support.  Dimension 6 2 Patient liveswith her sister in her apartment. Patient is employed, working 25-30 hours per week.  Patient reports having 2 children minor children who live with her mother but will be living with her again full time starting 11/3... She reports that the CPS order expires 11/3 at which time she will get custody of her 2 kids. Patient denies any past or current legal issues.  Client has acquired a car and has enrolled in college classes.    Arnold Vasquez, AMENA

## 2020-11-10 ENCOUNTER — HOSPITAL ENCOUNTER (OUTPATIENT)
Dept: BEHAVIORAL HEALTH | Facility: CLINIC | Age: 25
End: 2020-11-10
Attending: FAMILY MEDICINE
Payer: COMMERCIAL

## 2020-11-10 PROCEDURE — H2035 A/D TX PROGRAM, PER HOUR: HCPCS | Mod: HQ,GT

## 2020-11-10 NOTE — PROGRESS NOTES
"Samuel Cowan  [id number]    Treatment Plan Review     Treatment Plan Review completed on:  11/10/2020 for Review Period: Monday, 11/9/20 thru Thursday, 11/12/20    Total # of Phase 1 Group Sessions:      Total # of Phase 2 Group Sessions:   Total # of Phase 3 Group Sessions: 1  6 thr 11/3  Total # of 1:1 Sessions:   Projected discharge date: 12/4/20    Group Attendance Dates and Clients Response:   11/10/20, Response to group (ie-attentive, engaged and participatory): Samuel was engaged and participatory in this group.    Skills Group Sessions Hours:   Psychoeducational Group Hours: 2  Individual Hours:   Absences:     Note: P1= Phase I IOP & P2= Phase II OP  (IE-P1-3hrs)   MONDAY TUESDAY WEDNESDAY THURSDAY FRIDAY SATURDAY SUNDAY TOTAL HOURS   Skills Group Sessions           Psycho-  Educational Groups            Specialty Groups*            1:1 Sessions           [place]/SI             Phase III    2      2   Absent (place \"X\")             Total's & Client's Response  2      2   *Specialty Groups include Mental Health Care, Assertiveness and Communication, Sobriety Maintenance Skills, Spiritual Care, Stress Management, Relapse Prevention, Family Systems.           Support group attended this week: Yes     Reporting sobriety: Yes     Client preferred learning style: by reading, by hands-on practice and by watching someone else demonstrate     Staff member(s) contributing: AMENA Subramanian      Received supervision: No.     Client involvement with treatment planning: contributed to goals and plan.     Client received copy of plan/revised plan: Yes     Client agrees with plan/revised plan: Yes     Changes to Treatment Plan: No     New Goals added since last review: None.     Goal(s) worked on since last review:None   Strategies effective: Yes     Treatment Coordination Activities: None.     Medical, Mental Health and other appointments the client attended: None.     Medication issues: None.    Physical and " mental health problems:No. Client reports no SI/SH in last 30 days.     Review and evaluation of the individual abuse prevention plan: The program's individual abuse prevention plan (IAPP) is sufficient for this client.   Substance Use Disorders:    Alcohol Use Disorder Severe - 303.90 (F10.20)  Cannabis Use Disorder Moderate - 304.30 (F12.20)    ASAM Risk Rating: (Note the rationale for risk rating changes)  Dimension 1 0 Client displays full functioning. No signs or symptoms of intoxication or withdrawal. Client currently rates desire to use substances at 0/10.   Dimension 2 1 The patient reported being in good overall physical health and she denied having any history of chronic medical problems.  The patient denied taking any prescription or OTC medications at this time.  Client reports exercise of walking.     Dimension 3 2 Client has difficulty with impulse control and lacks coping skills. Client has thoughts of suicide or harm to others without means; however, the thoughts may interfere with participation in some treatment activities. Client has difficulty functioning in significant life areas. Client has moderate symptoms of emotional, behavioral, or cognitive problems. Client is able to participate in most treatment activities.     Dimension 4 1 Client is motivated with active reinforcement, to explore treatment and strategies for change, but ambivalent about illness or need for change.Client needs to start exporing sober support more consistently. Client reported attending sober support on Tuesday. Client attended AA meeting again this week and plans to continue.    Dimension 5  1Client has minimal relapse potential, fair ability to care for self. Client reports making sober friends. Attending sober support.    Dimension 6 2 Patient liveswith her sister in her apartment. Patient is employed, working 25-30 hours per week.  Patient reports having 2 children minor children who will be living were to start her  again as of11/3 but now postponed for 2 weeks. She reports that the CPS order expires 11/3 at which time she will get custody of her 2 kids. Due to having tested positive fo COVID, reuniting with her kids has been delayed. Patient denies any past or current legal issues.  Client has acquired a car and has enrolled in college classes.    AMENA Subramanian

## 2020-11-10 NOTE — GROUP NOTE
"Group Therapy Documentation    PATIENT'S NAME: Samuel Cowan  MRN:   [id number]  :   1995  ACCT. NUMBER: [id number]  DATE OF SERVICE: 11/10/20  START TIME:  5:30 PM  END TIME:  7:30 PM  FACILITATOR(S): Arnold Vasquez LADC  TOPIC: BEH Group Therapy  Number of patients attending the group:  6  Group Length:  2 Hours    Group Therapy Type: Addiction    Summary of Group / Topics Discussed:    Choices in Recovery  Clients to present their Recovery Care Plan\" Assignments in group. Phase 3: Clients in Phase 3 discussed the skills they learned and are practicing in early recovery. Discussed components to successful recovery they will implement, things that are missing and things they are working to develop/obtain. This relates to clients Dim 6 treatment plan goals.    Telemedicine Visit: The patient's condition can be safely assessed and treated via synchronous audio and visual telemedicine encounter.      Reason for Telemedicine Visit: Services only offered telehealth    Originating Site (Patient Location): Patient's home    Distant Site (Provider Location): Provider Remote Setting    Consent:  The patient/guardian has verbally consented to: the potential risks and benefits of telemedicine (video visit) versus in person care; bill my insurance or make self-payment for services provided; and responsibility for payment of non-covered services.     Mode of Communication:  Video Conference via [employer]    As the provider I attest to compliance with applicable laws and regulations related to telemedicine.          Group Attendance:  Attended group session    Patient's response to the group topic/interactions:  cooperative with task    Patient appeared to be Passively engaged.        Client specific details:  Client was quiet during group but she did check in. Said she is isolated in her bedroom with COVID as her pregnant sister is in the living room. She did share she attend her weekly AA womens meeting last " Tuesday. Indicated she is considering asking someone to be her sponsor.. This relates to Clt Dim 6 treatment plan goal of having established a sober support network, identify and attend meetings regularly and begin working with a sponsor.

## 2020-11-24 ENCOUNTER — HOSPITAL ENCOUNTER (OUTPATIENT)
Dept: BEHAVIORAL HEALTH | Facility: CLINIC | Age: 25
End: 2020-11-24
Attending: FAMILY MEDICINE
Payer: COMMERCIAL

## 2020-11-24 PROCEDURE — H2035 A/D TX PROGRAM, PER HOUR: HCPCS | Mod: HQ,GT

## 2020-11-24 NOTE — GROUP NOTE
Group Therapy Documentation    PATIENT'S NAME: Samuel Cowan  MRN:   [id number]  :   1995  ACCT. NUMBER: [id number]  DATE OF SERVICE: 20  START TIME:  5:30 PM  END TIME:  7:30 PM  FACILITATOR(S): Arnold Vasquez LADC  TOPIC: BEH Group Therapy  Number of patients attending the group:  6  Group Length:  2 Hours    Group Therapy Type: Life skill(s)    Summary of Group / Topics Discussed:    Choices in Recovery  Dimension 6 Treatment Plan Goal progress discussion      Telemedicine Visit: The patient's condition can be safely assessed and treated via synchronous audio and visual telemedicine encounter.      Reason for Telemedicine Visit: Services only offered telehealth    Originating Site (Patient Location): Patient's home    Distant Site (Provider Location): Provider Remote Setting    Consent:  The patient/guardian has verbally consented to: the potential risks and benefits of telemedicine (video visit) versus in person care; bill my insurance or make self-payment for services provided; and responsibility for payment of non-covered services.     Mode of Communication:  Video Conference via [employer]    As the provider I attest to compliance with applicable laws and regulations related to telemedicine.        Group Attendance:  Attended group session    Patient's response to the group topic/interactions:  cooperative with task and discussed personal experience with topic    Patient appeared to be Actively participating.        Client specific details:  Client shared half of her recovery care plan with group tonight. She recently go custody of her 2 young children and is trying to settle into a new routine that is good for the kids and still supportive of her recovery. One peer expressed a concern that she could easily become overwhelmed and need to make her development of sober support a priority. Client shared that she is doing 2 meetings a week, has good daily structure and is looking for a sponsor  and opp for fellowship with the women in her group.. This relates to client dim 6 tx plan goal.

## 2020-11-24 NOTE — PROGRESS NOTES
"Samuel Cowan  [id number]    Treatment Plan Review     Treatment Plan Review completed on:  11/24/2020 for Review Period: Monday, 11/23/20 thru Thursday, 11/25/20    Total # of Phase 1 Group Sessions:      Total # of Phase 2 Group Sessions:   Total # of Phase 3 Group Sessions: 1  7 thr 11/24  Total # of 1:1 Sessions:   Projected discharge date: 12/4/20    Group Attendance Dates and Clients Response:   11/10/20, Client was absent this week. No call/no show. Client was called emailed that additional unexcused will result in discharge.  Skills Group Sessions Hours:   Psychoeducational Group Hours: 2  Individual Hours:   Absences:     Note: P1= Phase I IOP & P2= Phase II OP  (IE-P1-3hrs)   MONDAY TUESDAY WEDNESDAY THURSDAY FRIDAY SATURDAY SUNDAY TOTAL HOURS   Skills Group Sessions           Psycho-  Educational Groups            Specialty Groups*            1:1 Sessions           [place]/SI             Phase III    2      2   Absent (place \"X\")             Total's & Client's Response        2   *Specialty Groups include Mental Health Care, Assertiveness and Communication, Sobriety Maintenance Skills, Spiritual Care, Stress Management, Relapse Prevention, Family Systems.           Support group attended this week:       Reporting sobriety:     Client preferred learning style: by reading, by hands-on practice and by watching someone else demonstrate     Staff member(s) contributing: AMENA Subramanian      Received supervision: No.     Client involvement with treatment planning: contributed to goals and plan.     Client received copy of plan/revised plan: Yes     Client agrees with plan/revised plan: Yes     Changes to Treatment Plan: No     New Goals added since last review: None.     Goal(s) worked on since last review: Dim 6  Strategies effective: Yes     Treatment Coordination Activities: None.     Medical, Mental Health and other appointments the client attended: None.     Medication issues: None.    Physical " and mental health problems:No. Client reports no SI/SH in last 30 days.     Review and evaluation of the individual abuse prevention plan: The program's individual abuse prevention plan (IAPP) is sufficient for this client.   Substance Use Disorders:    Alcohol Use Disorder Severe - 303.90 (F10.20)  Cannabis Use Disorder Moderate - 304.30 (F12.20)    ASAM Risk Rating: (Note the rationale for risk rating changes)  Dimension 1 0 Client displays full functioning. No signs or symptoms of intoxication or withdrawal. Client currently rates desire to use substances at 0/10.   Dimension 2 1 The patient reported being in good overall physical health and she denied having any history of chronic medical problems.  The patient denied taking any prescription or OTC medications at this time.  Client reports exercise of walking.      Dimension 3 2 Client has difficulty with impulse control and lacks coping skills. Client has thoughts of suicide or harm to others without means; however, the thoughts may interfere with participation in some treatment activities. Client has difficulty functioning in significant life areas. Client has moderate symptoms of emotional, behavioral, or cognitive problems. Client is able to participate in most treatment activities.     Dimension 4 1 Client is motivated with active reinforcement, to explore treatment and strategies for change, but ambivalent about illness or need for change.Client needs to start exporing sober support more consistently. Client reported attending sober support on Tuesday. Client attended AA meeting again this week and plans to continue.    Dimension 5  1Client has minimal relapse potential, fair ability to care for self. Client reports making sober friends. Attending sober support.    Dimension 6 2 Patient liveswith her sister in her apartment. Patient is employed, working 25-30 hours per week.  Patient reports having 2 children minor children who will be living were to start  her again as of11/3 but now postponed for 2 weeks. She reports that the CPS order expires 11/3 at which time she will get custody of her 2 kids. Due to having tested positive fo COVID, reuniting with her kids has been delayed. Patient denies any past or current legal issues.  Client has acquired a car and has enrolled in college classes.     Arnold Vasquez, AMENA

## 2022-08-16 NOTE — GROUP NOTE
Group Therapy Documentation    PATIENT'S NAME: Samuel Cowan  MRN:   [id number]  :   1995  ACCT. NUMBER: [id number]  DATE OF SERVICE: 20  START TIME:  5:30 PM  END TIME:  8:30 PM  FACILITATOR(S): Arnold Vasquez LADC  TOPIC: BEH Group Therapy  Number of patients attending the group:  9  Group Length:  3 Hours    Group Therapy Type: Psychoeducation    Summary of Group / Topics Discussed:    Coping Skills/Lifestyle Managemet and Mindfulness      Group Attendance:  Attended group session    Patient's response to the group topic/interactions:  cooperative with task    Patient appeared to be Engaged.        Client specific details:  Client participated in introductions and welcomed new peers followed by a brief mediation and reading and discussion from [employer]. Client participated in a reading on the topic of Maintaining Motivation in LT Recovery and watched a video to introduce Mindfulness. Clients discussed and explored how these tools and solutions that can be used to support their recovery. Group ended with Client participating in group process of relevant recovery issues presented by participants that are being encountered in their lives.    Telemedicine Visit: The patient's condition can be safely assessed and treated via synchronous audio and visual telemedicine encounter.      Reason for Telemedicine Visit: Services only offered telehealth    Originating Site (Patient Location): Patient's home    Distant Site (Provider Location): Provider Remote Setting    Consent:  The patient/guardian has verbally consented to: the potential risks and benefits of telemedicine (video visit) versus in person care; bill my insurance or make self-payment for services provided; and responsibility for payment of non-covered services.     Mode of Communication:  Video Conference via [employer]    As the provider I attest to compliance with applicable laws and regulations related to telemedicine.    
Continue Regimen: Tretinoin cream\\nSulfacetamide/sulfur 10%/5% cleanser
Render In Strict Bullet Format?: No
Detail Level: Zone
Initiate Treatment: Tretinoin cream

## 2024-04-24 NOTE — GROUP NOTE
Group Therapy Documentation    PATIENT'S NAME: Samuel Cowan  MRN:   [id number]  :   1995  ACCT. NUMBER: [id number]  DATE OF SERVICE: 6/10/20  START TIME:  5:30 PM  END TIME:  7:30 PM  FACILITATOR(S): Arnold Vasquez LADC  TOPIC: BEH Group Therapy  Number of patients attending the group:  5  Group Length:  2 Hours    Group Therapy Type: Skills/Education    Summary of Group / Topics Discussed:    Rebuilding Trust      Group Attendance:  Attended group session    Patient's response to the group topic/interactions:  cooperative with task and expressed readiness to alter behaviors    Patient appeared to be Actively participating.        Client specific details:  Client identified 2 components to long term recovery she needed to be acquired as  Need sober connections in AA meetings and a sponsor. She shared she has the components of journaling and prayer actively in place .   This relates to clients Dim 5 treatment plan goal of Develop sober coping and living skills in order to address the development of a strategy for long term recovery AEB the above.    Telemedicine Visit: The patient's condition can be safely assessed and treated via synchronous audio and visual telemedicine encounter.      Reason for Telemedicine Visit: Services only offered telehealth    Originating Site (Patient Location): Patient's home    Distant Site (Provider Location): Provider Remote Setting    Consent:  The patient/guardian has verbally consented to: the potential risks and benefits of telemedicine (video visit) versus in person care; bill my insurance or make self-payment for services provided; and responsibility for payment of non-covered services.     Mode of Communication:  Video Conference via Sendbloom    As the provider I attest to compliance with applicable laws and regulations related to telemedicine.     Statement Selected